# Patient Record
Sex: FEMALE | Race: WHITE | NOT HISPANIC OR LATINO | URBAN - METROPOLITAN AREA
[De-identification: names, ages, dates, MRNs, and addresses within clinical notes are randomized per-mention and may not be internally consistent; named-entity substitution may affect disease eponyms.]

---

## 2020-07-01 ENCOUNTER — OFFICE VISIT (OUTPATIENT)
Dept: URBAN - METROPOLITAN AREA CLINIC 113 | Facility: CLINIC | Age: 60
End: 2020-07-01

## 2020-07-25 ENCOUNTER — TELEPHONE ENCOUNTER (OUTPATIENT)
Dept: URBAN - METROPOLITAN AREA CLINIC 13 | Facility: CLINIC | Age: 60
End: 2020-07-25

## 2020-07-25 RX ORDER — PENTOSAN POLYSULFATE SODIUM 100 MG/1
TAKE CAPSULE  PRN CAPSULE, GELATIN COATED ORAL
Refills: 0 | OUTPATIENT
End: 2017-06-22

## 2020-07-25 RX ORDER — METHENAMINE, SODIUM PHOSPHATE, MONOBASIC, MONOHYDRATE, PHENYL SALICYLATE, METHYLENE BLUE, AND HYOSCYAMINE SULFATE 118; 40.8; 36; 10; .12 MG/1; MG/1; MG/1; MG/1; MG/1
CAPSULE ORAL
Refills: 0 | OUTPATIENT
End: 2017-06-22

## 2020-07-25 RX ORDER — POLYETHYLENE GLYCOL 3350, SODIUM CHLORIDE, SODIUM BICARBONATE AND POTASSIUM CHLORIDE WITH LEMON FLAVOR 420; 11.2; 5.72; 1.48 G/4L; G/4L; G/4L; G/4L
TAKE 1/2 GALLON AT 5:00 PM DAY BEFORE PROCEDURE, TAKE SECOND 1/2 OF GALLON 6 HRS PRIOR TO PROCEDURE POWDER, FOR SOLUTION ORAL
Qty: 1 | Refills: 0 | OUTPATIENT
Start: 2017-08-28 | End: 2017-12-15

## 2020-07-25 RX ORDER — SITAGLIPTIN 50 MG/1
TAKE 1 TABLET DAILY TABLET, FILM COATED ORAL
Refills: 0 | OUTPATIENT
Start: 2017-06-22 | End: 2020-01-30

## 2020-07-25 RX ORDER — SUCRALFATE 1 G/10ML
TAKE 1 GM TWICE DAILY PRN SUSPENSION ORAL
Qty: 1 | Refills: 0 | OUTPATIENT
Start: 2017-07-19 | End: 2017-09-14

## 2020-07-25 RX ORDER — METOCLOPRAMIDE 5 MG/1
TAKE 1 TABLET BEFORE MEALS TABLET ORAL
Qty: 90 | Refills: 2 | OUTPATIENT
Start: 2017-09-14 | End: 2017-12-29

## 2020-07-25 RX ORDER — EZETIMIBE 10 MG/1
TAKE 1 TABLET AT BEDTIME TABLET ORAL
Refills: 0 | OUTPATIENT
End: 2020-07-01

## 2020-07-25 RX ORDER — METHENAMINE, SODIUM PHOSPHATE, MONOBASIC, MONOHYDRATE, PHENYL SALICYLATE, METHYLENE BLUE, AND HYOSCYAMINE SULFATE 118; 40.8; 36; 10; .12 MG/1; MG/1; MG/1; MG/1; MG/1
USE AS DIRECTED AS NEEDED CAPSULE ORAL
Refills: 0 | OUTPATIENT
End: 2020-01-30

## 2020-07-25 RX ORDER — DESVENLAFAXINE SUCCINATE 50 MG
TAKE 1 TABLET DAILY TABLET, EXTENDED RELEASE 24 HR ORAL
Refills: 0 | OUTPATIENT
End: 2017-06-22

## 2020-07-25 RX ORDER — MIRABEGRON 50 MG/1
TAKE TABLET  PRN TABLET, FILM COATED, EXTENDED RELEASE ORAL
Refills: 0 | OUTPATIENT
End: 2017-06-22

## 2020-07-25 RX ORDER — NITROFURANTOIN MONOHYDRATE/MACROCRYSTALLINE 25; 75 MG/1; MG/1
TAKE CAPSULE  PRN CAPSULE ORAL
Refills: 0 | OUTPATIENT
End: 2017-12-15

## 2020-07-25 RX ORDER — OMEPRAZOLE 20 MG/1
TAKE 1 CAPSULE DAILY CAPSULE, DELAYED RELEASE ORAL
Qty: 90 | Refills: 3 | OUTPATIENT
Start: 2017-12-15 | End: 2020-01-30

## 2020-07-25 RX ORDER — GLIMEPIRIDE 2 MG/1
TAKE 1 TABLET TWICE DAILY TABLET ORAL
Refills: 0 | OUTPATIENT
End: 2020-01-30

## 2020-07-25 RX ORDER — DIAZEPAM 10 MG/1
TAKE 1 TABLET 3 TIMES DAILY AS NEEDED TABLET ORAL
Refills: 0 | OUTPATIENT
End: 2017-09-14

## 2020-07-25 RX ORDER — LIOTHYRONINE SODIUM 5 UG/1
TAKE 1 TABLET DAILY TABLET ORAL
Refills: 0 | OUTPATIENT
End: 2017-09-14

## 2020-07-25 RX ORDER — GLIMEPIRIDE 4 MG/1
TAKE 1 TABLET DAILY TABLET ORAL
Refills: 0 | OUTPATIENT
Start: 2019-04-01 | End: 2020-02-25

## 2020-07-25 RX ORDER — EZETIMIBE 10 MG/1
TAKE 1 TABLET DAILY TABLET ORAL
Refills: 0 | OUTPATIENT
Start: 2017-06-22 | End: 2017-07-19

## 2020-07-25 RX ORDER — DEXLANSOPRAZOLE 60 MG/1
TAKE 1 CAPSULE AS DIRECTED CAPSULE, DELAYED RELEASE ORAL
Refills: 0 | OUTPATIENT
End: 2015-12-08

## 2020-07-25 RX ORDER — OMEPRAZOLE 40 MG/1
TAKE 1 CAPSULE TWICE DAILY BEFORE BREAKFAST AND BEFORE DINNER CAPSULE, DELAYED RELEASE ORAL
Qty: 60 | Refills: 3 | OUTPATIENT
End: 2020-01-30

## 2020-07-25 RX ORDER — MELOXICAM 15 MG/1
TAKE 1 TABLET DAILY AS NEEDED TABLET ORAL
Refills: 0 | OUTPATIENT
End: 2017-07-19

## 2020-07-25 RX ORDER — VIT C/E/CUPERIC/ZINC/LUTEIN 226-90-0.8
TAKE 1 CAPSULE DAILY CAPSULE ORAL
Refills: 0 | OUTPATIENT
End: 2015-12-08

## 2020-07-25 RX ORDER — ESZOPICLONE 3 MG/1
TAKE 1 TABLET AT BEDTIME AS NEEDED FOR SLEEP TABLET, FILM COATED ORAL
Refills: 0 | OUTPATIENT
Start: 2019-11-08 | End: 2020-01-30

## 2020-07-25 RX ORDER — DICYCLOMINE HYDROCHLORIDE 10 MG/1
TAKE 1 CAPSULE BY MOUTH EVERY 6 HOURS AS NEEDED FOR ABDOMINAL CRAMPS CAPSULE ORAL
Qty: 30 | Refills: 0 | OUTPATIENT
Start: 2016-11-08 | End: 2020-01-30

## 2020-07-25 RX ORDER — SIMVASTATIN 20 MG/1
TAKE 1 TABLET DAILY TABLET, FILM COATED ORAL
Refills: 0 | OUTPATIENT
End: 2015-12-08

## 2020-07-25 RX ORDER — DICYCLOMINE HYDROCHLORIDE 10 MG/1
TAKE 1 CAPSULE EVERY 6 HOURS PRN IF NEEDED FOR PAIN CAPSULE ORAL
Qty: 45 | Refills: 2 | OUTPATIENT
Start: 2016-11-08

## 2020-07-25 RX ORDER — METFORMIN HYDROCHLORIDE 750 MG/1
TAKE 2 TABLETS ONCE DAILY WITH THE EVENING MEAL TABLET, EXTENDED RELEASE ORAL
Refills: 0 | OUTPATIENT
End: 2020-01-30

## 2020-07-25 RX ORDER — TRAMADOL HYDROCHLORIDE 50 MG/1
TAKE 1 TABLET BY MOUTH EVERY 6 HOURS AS NEEDED FOR PAIN TABLET ORAL
Qty: 50 | Refills: 0 | OUTPATIENT
End: 2020-01-30

## 2020-07-25 RX ORDER — EZETIMIBE 10 MG/1
TAKE 1 TABLET DAILY TABLET ORAL
Refills: 0 | OUTPATIENT
End: 2017-06-22

## 2020-07-25 RX ORDER — INSULIN DEGLUDEC INJECTION 200 U/ML
INJECT SUBCUTANEOUSLY AS DIRECTED INJECTION, SOLUTION SUBCUTANEOUS
Refills: 0 | OUTPATIENT
Start: 2019-11-12 | End: 2020-02-25

## 2020-07-25 RX ORDER — HYOSCYAMINE SULFATE 0.12 MG/1
PLACE 1 TABLET EVERY 6 HOURS PRN ABD PAIN TABLET, ORALLY DISINTEGRATING ORAL
Qty: 60 | Refills: 3 | OUTPATIENT
Start: 2016-02-05 | End: 2017-09-14

## 2020-07-25 RX ORDER — PANTOPRAZOLE SODIUM 40 MG/1
TAKE 1 TABLET DAILY TABLET, DELAYED RELEASE ORAL
Refills: 11 | OUTPATIENT
Start: 2019-11-27 | End: 2020-02-25

## 2020-07-25 RX ORDER — ALPRAZOLAM 0.5 MG
TAKE 1 TABLET DAILY AS NEEDED TABLET ORAL
Refills: 0 | OUTPATIENT
End: 2020-01-30

## 2020-07-25 RX ORDER — EZETIMIBE 10 MG/1
TAKE 1 TABLET DAILY TABLET ORAL
Qty: 30 | Refills: 0 | OUTPATIENT
Start: 2017-09-14

## 2020-07-26 ENCOUNTER — TELEPHONE ENCOUNTER (OUTPATIENT)
Dept: URBAN - METROPOLITAN AREA CLINIC 13 | Facility: CLINIC | Age: 60
End: 2020-07-26

## 2020-07-26 RX ORDER — AZITHROMYCIN DIHYDRATE 250 MG/1
TABLET, FILM COATED ORAL
Qty: 6 | Refills: 0 | Status: ACTIVE | COMMUNITY
Start: 2019-12-04

## 2020-07-26 RX ORDER — ALPRAZOLAM 1 MG/1
TAKE 1 TABLET 3 TIMES DAILY AS NEEDED TABLET ORAL
Refills: 0 | Status: ACTIVE | COMMUNITY
Start: 2019-10-01

## 2020-07-26 RX ORDER — SUCRALFATE 1 G/1
TABLET ORAL
Qty: 60 | Refills: 0 | Status: ACTIVE | COMMUNITY
Start: 2019-09-07

## 2020-07-26 RX ORDER — MIRABEGRON 50 MG/1
TAKE 1 TABLET DAILY TABLET, FILM COATED, EXTENDED RELEASE ORAL
Refills: 0 | Status: ACTIVE | COMMUNITY

## 2020-07-26 RX ORDER — PROMETHAZINE HYDROCHLORIDE 25 MG/1
TAKE 1 TABLET EVERY 6 HOURS PRN NAUSEA TABLET ORAL
Refills: 3 | Status: ACTIVE | COMMUNITY
Start: 2019-11-27

## 2020-07-26 RX ORDER — ONDANSETRON HYDROCHLORIDE 4 MG/1
TAKE 1 TABLET EVERY 8 HOURS AS NEEDED FOR NAUSEA AND VOMITING TABLET, FILM COATED ORAL
Qty: 30 | Refills: 1 | Status: ACTIVE | COMMUNITY
Start: 2020-01-30

## 2020-07-26 RX ORDER — BLOOD SUGAR DIAGNOSTIC
STRIP MISCELLANEOUS
Qty: 400 | Refills: 0 | Status: ACTIVE | COMMUNITY
Start: 2019-05-28

## 2020-07-26 RX ORDER — INSULIN GLARGINE AND LIXISENATIDE 100; 33 U/ML; UG/ML
INJECTION, SOLUTION SUBCUTANEOUS
Qty: 3 | Refills: 0 | Status: ACTIVE | COMMUNITY
Start: 2019-06-10

## 2020-07-26 RX ORDER — INSULIN GLARGINE AND LIXISENATIDE 100; 33 U/ML; UG/ML
INJECT 50 TO 60 UNITS D INJECTION, SOLUTION SUBCUTANEOUS
Qty: 6 | Refills: 0 | Status: ACTIVE | COMMUNITY
Start: 2019-06-25

## 2020-07-26 RX ORDER — INSULIN GLARGINE AND LIXISENATIDE 100; 33 U/ML; UG/ML
INJECTION, SOLUTION SUBCUTANEOUS
Qty: 15 | Refills: 0 | Status: ACTIVE | COMMUNITY
Start: 2019-07-16

## 2020-07-26 RX ORDER — DESVENLAFAXINE SUCCINATE 100 MG/1
TAKE 2 TABLET DAILY TABLET, EXTENDED RELEASE ORAL
Refills: 0 | Status: ACTIVE | COMMUNITY

## 2020-07-26 RX ORDER — DEXLANSOPRAZOLE 60 MG/1
TAKE 1 CAPSULE DAILY EVERY MORNING BEFORE BREAKFAST CAPSULE, DELAYED RELEASE ORAL
Qty: 90 | Refills: 3 | Status: ACTIVE | COMMUNITY
Start: 2020-01-30

## 2020-07-26 RX ORDER — DILTIAZEM HYDROCHLORIDE 120 MG/1
TK 1 C PO D CAPSULE, COATED, EXTENDED RELEASE ORAL
Qty: 90 | Refills: 0 | Status: ACTIVE | COMMUNITY
Start: 2020-02-04

## 2020-07-26 RX ORDER — INSULIN DEGLUDEC INJECTION 100 U/ML
INJECT 55 UNITS SC QAM INJECTION, SOLUTION SUBCUTANEOUS
Qty: 9 | Refills: 0 | Status: ACTIVE | COMMUNITY
Start: 2019-08-29

## 2020-07-26 RX ORDER — NITROFURANTOIN 50 MG/1
CAPSULE ORAL
Qty: 30 | Refills: 0 | Status: ACTIVE | COMMUNITY
Start: 2019-02-04

## 2020-07-26 RX ORDER — FLUCONAZOLE 150 MG/1
TABLET ORAL
Qty: 4 | Refills: 0 | Status: ACTIVE | COMMUNITY
Start: 2019-06-25

## 2020-07-26 RX ORDER — BLOOD SUGAR DIAGNOSTIC
STRIP MISCELLANEOUS
Qty: 400 | Refills: 0 | Status: ACTIVE | COMMUNITY
Start: 2019-08-14

## 2020-07-26 RX ORDER — AMOXICILLIN 500 MG/1
CAPSULE ORAL
Qty: 15 | Refills: 0 | Status: ACTIVE | COMMUNITY
Start: 2019-07-01

## 2020-07-26 RX ORDER — AZITHROMYCIN DIHYDRATE 250 MG/1
TABLET, FILM COATED ORAL
Qty: 6 | Refills: 0 | Status: ACTIVE | COMMUNITY
Start: 2019-12-05

## 2020-07-26 RX ORDER — INSULIN ASPART 100 [IU]/ML
INJECT SUBCUTANEOUSLY AS DIRECTED INJECTION, SOLUTION INTRAVENOUS; SUBCUTANEOUS
Refills: 0 | Status: ACTIVE | COMMUNITY
Start: 2019-09-19

## 2020-07-26 RX ORDER — ZOLPIDEM TARTRATE 10 MG/1
TK 1 T PO  QHS PRN TABLET, FILM COATED ORAL
Qty: 30 | Refills: 0 | Status: ACTIVE | COMMUNITY
Start: 2020-03-19

## 2020-07-26 RX ORDER — RIZATRIPTAN BENZOATE 10 MG/1
TAKE 1 TABLET AT ONSET OF HEADACHE. MAY REPEAT EVERY 2 HOURS AS NEEDED. MAXIMUM 3 TABLETS IN 24 HOURS TABLET ORAL
Refills: 0 | Status: ACTIVE | COMMUNITY
Start: 2019-11-27

## 2020-07-26 RX ORDER — ROSUVASTATIN CALCIUM 40 MG/1
TAKE 1 TABLET DAILY TABLET, FILM COATED ORAL
Refills: 0 | Status: ACTIVE | COMMUNITY

## 2020-07-26 RX ORDER — AMOXICILLIN AND CLAVULANATE POTASSIUM 875; 125 MG/1; MG/1
TABLET, FILM COATED ORAL
Qty: 14 | Refills: 0 | Status: ACTIVE | COMMUNITY
Start: 2019-11-07

## 2020-07-26 RX ORDER — INSULIN ASPART 100 [IU]/ML
INJECTION, SOLUTION INTRAVENOUS; SUBCUTANEOUS
Qty: 50 | Refills: 0 | Status: ACTIVE | COMMUNITY
Start: 2020-03-30

## 2020-07-26 RX ORDER — SUCRALFATE 1 G/1
TK 1 T PO AC AND HS ON AN EMPTY STOMACH TABLET ORAL
Qty: 60 | Refills: 0 | Status: ACTIVE | COMMUNITY
Start: 2019-09-06

## 2020-07-26 RX ORDER — NITROFURANTOIN 50 MG/1
CAPSULE ORAL
Qty: 30 | Refills: 0 | Status: ACTIVE | COMMUNITY
Start: 2019-06-14

## 2020-07-26 RX ORDER — HYDROXYZINE HYDROCHLORIDE 25 MG/1
TAKE 1 TABLET EVERY 8 HOURS PRN ITCHING TABLET, FILM COATED ORAL
Qty: 90 | Refills: 2 | Status: ACTIVE | COMMUNITY
Start: 2019-10-02

## 2020-07-26 RX ORDER — NYSTATIN 100000 [USP'U]/G
CREAM TOPICAL
Qty: 30 | Refills: 0 | Status: ACTIVE | COMMUNITY
Start: 2019-05-13

## 2020-07-26 RX ORDER — METHYLPREDNISOLONE 4 MG/1
TABLET ORAL
Qty: 21 | Refills: 0 | Status: ACTIVE | COMMUNITY
Start: 2019-07-22

## 2020-07-26 RX ORDER — INSULIN GLARGINE AND LIXISENATIDE 100; 33 U/ML; UG/ML
INJECTION, SOLUTION SUBCUTANEOUS
Qty: 3 | Refills: 0 | Status: ACTIVE | COMMUNITY
Start: 2019-05-28

## 2020-07-26 RX ORDER — BLOOD SUGAR DIAGNOSTIC
TEST BLOOD SUGAR 6 TIMES D STRIP MISCELLANEOUS
Qty: 150 | Refills: 0 | Status: ACTIVE | COMMUNITY
Start: 2020-02-20

## 2020-07-26 RX ORDER — CEPHALEXIN 500 MG/1
TK 1 C PO Q 12 H CAPSULE ORAL
Qty: 14 | Refills: 0 | Status: ACTIVE | COMMUNITY
Start: 2019-09-06

## 2020-07-26 RX ORDER — NYSTATIN 100000 [USP'U]/G
APPLY AA BID CREAM TOPICAL
Qty: 30 | Refills: 0 | Status: ACTIVE | COMMUNITY
Start: 2018-12-27

## 2020-07-26 RX ORDER — NYSTATIN 100000 [USP'U]/G
APP EXT AA BID CREAM TOPICAL
Qty: 30 | Refills: 0 | Status: ACTIVE | COMMUNITY
Start: 2019-10-02

## 2020-07-26 RX ORDER — FLUCONAZOLE 150 MG/1
TAKE AS DIRECTED TABLET ORAL
Qty: 6 | Refills: 0 | Status: ACTIVE | COMMUNITY
Start: 2019-10-09

## 2020-07-26 RX ORDER — INSULIN GLARGINE AND LIXISENATIDE 100; 33 U/ML; UG/ML
INJ 45 UNITS SC QD. INCREASE BY 5 UNITS Q 5 DAYS UTD. MAXIMUM OF 60 UN INJECTION, SOLUTION SUBCUTANEOUS
Qty: 15 | Refills: 0 | Status: ACTIVE | COMMUNITY
Start: 2019-07-09

## 2020-07-26 RX ORDER — AMOXICILLIN 500 MG/1
CAPSULE ORAL
Qty: 20 | Refills: 0 | Status: ACTIVE | COMMUNITY
Start: 2019-07-03

## 2020-07-26 RX ORDER — FAMOTIDINE 40 MG/1
TAKE 1 TABLET BY MOUTH AT BEDTIME TABLET ORAL
Qty: 90 | Refills: 3 | Status: ACTIVE | COMMUNITY
Start: 2020-07-01

## 2020-07-26 RX ORDER — DILTIAZEM HYDROCHLORIDE 120 MG/1
CAPSULE, COATED, EXTENDED RELEASE ORAL
Qty: 90 | Refills: 0 | Status: ACTIVE | COMMUNITY
Start: 2020-02-05

## 2020-07-26 RX ORDER — CANAGLIFLOZIN 100 MG/1
TAKE 1 TABLET BY MOUTH ONCE DAILY 30 MINS BEFORE BREAKFAST TABLET, FILM COATED ORAL
Refills: 0 | Status: ACTIVE | COMMUNITY
Start: 2020-01-13

## 2020-07-26 RX ORDER — ZOLPIDEM TARTRATE 10 MG/1
TK 1 T PO QD HS TABLET, FILM COATED ORAL
Qty: 30 | Refills: 0 | Status: ACTIVE | COMMUNITY
Start: 2020-05-12

## 2020-07-26 RX ORDER — AMOXICILLIN 500 MG/1
CAPSULE ORAL
Qty: 20 | Refills: 0 | Status: ACTIVE | COMMUNITY
Start: 2019-08-14

## 2020-07-26 RX ORDER — DICYCLOMINE HYDROCHLORIDE 10 MG/1
TAKE 1 CAPSULE EVERY 6 HOURS AS NEEDED CAPSULE ORAL
Qty: 60 | Refills: 1 | Status: ACTIVE | COMMUNITY
Start: 2020-02-25

## 2020-07-26 RX ORDER — INSULIN DEGLUDEC INJECTION 100 U/ML
INJECTION, SOLUTION SUBCUTANEOUS
Qty: 9 | Refills: 0 | Status: ACTIVE | COMMUNITY
Start: 2019-09-18

## 2020-07-26 RX ORDER — ONDANSETRON HYDROCHLORIDE 4 MG/1
TABLET, FILM COATED ORAL
Qty: 18 | Refills: 0 | Status: ACTIVE | COMMUNITY
Start: 2019-05-13

## 2020-07-26 RX ORDER — INSULIN DEGLUDEC INJECTION 100 U/ML
INJECTION, SOLUTION SUBCUTANEOUS
Qty: 9 | Refills: 0 | Status: ACTIVE | COMMUNITY
Start: 2019-10-31

## 2020-07-26 RX ORDER — INSULIN ASPART 100 [IU]/ML
ADM UP TO 80 UNI VIA INSULIN PUMP D INJECTION, SOLUTION INTRAVENOUS; SUBCUTANEOUS
Qty: 20 | Refills: 0 | Status: ACTIVE | COMMUNITY
Start: 2020-02-20

## 2020-07-26 RX ORDER — LEVOTHYROXINE SODIUM 50 UG/1
TAKE 1 TABLET DAILY TABLET ORAL
Refills: 0 | Status: ACTIVE | COMMUNITY

## 2020-07-26 RX ORDER — METHYLPREDNISOLONE 4 MG/1
TABLET ORAL
Qty: 21 | Refills: 0 | Status: ACTIVE | COMMUNITY
Start: 2019-09-17

## 2020-07-26 RX ORDER — TRAZODONE HYDROCHLORIDE 100 MG/1
TABLET, FILM COATED ORAL
Qty: 90 | Refills: 0 | Status: ACTIVE | COMMUNITY
Start: 2019-06-28

## 2020-07-26 RX ORDER — FENOFIBRATE 145 MG/1
TABLET, FILM COATED ORAL
Qty: 30 | Refills: 0 | Status: ACTIVE | COMMUNITY
Start: 2019-09-17

## 2020-07-26 RX ORDER — LISINOPRIL AND HYDROCHLOROTHIAZIDE TABLETS 20; 12.5 MG/1; MG/1
TAKE 1 TABLET DAILY TABLET ORAL
Refills: 0 | Status: ACTIVE | COMMUNITY

## 2020-07-26 RX ORDER — INSULIN ASPART 100 [IU]/ML
INJECTION, SOLUTION INTRAVENOUS; SUBCUTANEOUS
Qty: 20 | Refills: 0 | Status: ACTIVE | COMMUNITY
Start: 2020-03-10

## 2020-07-26 RX ORDER — TRAZODONE HYDROCHLORIDE 100 MG/1
TK 1 T PO Q NIGHT TABLET, FILM COATED ORAL
Qty: 90 | Refills: 0 | Status: ACTIVE | COMMUNITY
Start: 2019-10-02

## 2020-07-26 RX ORDER — FENOFIBRATE 145 MG/1
TABLET, FILM COATED ORAL
Qty: 30 | Refills: 0 | Status: ACTIVE | COMMUNITY
Start: 2019-11-07

## 2020-07-26 RX ORDER — PREGABALIN 75 MG/1
TAKE 1 CAPSULE AT BEDTIME CAPSULE ORAL
Refills: 0 | Status: ACTIVE | COMMUNITY
Start: 2020-02-17

## 2020-07-26 RX ORDER — TRAZODONE HYDROCHLORIDE 100 MG/1
TABLET, FILM COATED ORAL
Qty: 90 | Refills: 0 | Status: ACTIVE | COMMUNITY
Start: 2019-03-27

## 2020-07-26 RX ORDER — INSULIN GLARGINE AND LIXISENATIDE 100; 33 U/ML; UG/ML
INJECTION, SOLUTION SUBCUTANEOUS
Qty: 6 | Refills: 0 | Status: ACTIVE | COMMUNITY
Start: 2019-07-07

## 2020-08-27 ENCOUNTER — OFFICE VISIT (OUTPATIENT)
Dept: URBAN - METROPOLITAN AREA CLINIC 113 | Facility: CLINIC | Age: 60
End: 2020-08-27
Payer: COMMERCIAL

## 2020-08-27 ENCOUNTER — WEB ENCOUNTER (OUTPATIENT)
Dept: URBAN - METROPOLITAN AREA CLINIC 113 | Facility: CLINIC | Age: 60
End: 2020-08-27

## 2020-08-27 VITALS
DIASTOLIC BLOOD PRESSURE: 80 MMHG | SYSTOLIC BLOOD PRESSURE: 115 MMHG | HEART RATE: 93 BPM | BODY MASS INDEX: 30.33 KG/M2 | TEMPERATURE: 98.4 F | HEIGHT: 63 IN | WEIGHT: 171.2 LBS

## 2020-08-27 DIAGNOSIS — K83.8 DILATED BILE DUCT: ICD-10-CM

## 2020-08-27 DIAGNOSIS — R11.0 NAUSEA: ICD-10-CM

## 2020-08-27 DIAGNOSIS — R10.13 EPIGASTRIC PAIN: ICD-10-CM

## 2020-08-27 DIAGNOSIS — E11.43 TYPE 2 DIABETES MELLITUS WITH DIABETIC AUTONOMIC (POLY)NEUROPATHY: ICD-10-CM

## 2020-08-27 PROCEDURE — 99213 OFFICE O/P EST LOW 20 MIN: CPT | Performed by: INTERNAL MEDICINE

## 2020-08-27 RX ORDER — DICYCLOMINE HYDROCHLORIDE 10 MG/1
TAKE 1 CAPSULE EVERY 6 HOURS AS NEEDED CAPSULE ORAL
Qty: 60 | Refills: 1 | Status: ACTIVE | COMMUNITY
Start: 2020-02-25

## 2020-08-27 RX ORDER — SUCRALFATE 1 G/1
TK 1 T PO AC AND HS ON AN EMPTY STOMACH TABLET ORAL
Qty: 60 | Refills: 0 | Status: ON HOLD | COMMUNITY
Start: 2019-09-06

## 2020-08-27 RX ORDER — INSULIN GLARGINE AND LIXISENATIDE 100; 33 U/ML; UG/ML
INJECTION, SOLUTION SUBCUTANEOUS
Qty: 3 | Refills: 0 | Status: ON HOLD | COMMUNITY
Start: 2019-05-28

## 2020-08-27 RX ORDER — CEPHALEXIN 500 MG/1
TK 1 C PO Q 12 H CAPSULE ORAL
Qty: 14 | Refills: 0 | Status: ON HOLD | COMMUNITY
Start: 2019-09-06

## 2020-08-27 RX ORDER — INSULIN ASPART 100 [IU]/ML
INJECT SUBCUTANEOUSLY AS DIRECTED INJECTION, SOLUTION INTRAVENOUS; SUBCUTANEOUS
Refills: 0 | Status: ON HOLD | COMMUNITY
Start: 2019-09-19

## 2020-08-27 RX ORDER — EZETIMIBE 10 MG/1
1 TABLET TABLET ORAL ONCE A DAY
Status: ACTIVE | COMMUNITY

## 2020-08-27 RX ORDER — ONABOTULINUMTOXINA 50 [USP'U]/1
AS DIRECTED INJECTION, POWDER, LYOPHILIZED, FOR SOLUTION INTRAMUSCULAR
Status: ACTIVE | COMMUNITY

## 2020-08-27 RX ORDER — BLOOD SUGAR DIAGNOSTIC
STRIP MISCELLANEOUS
Qty: 400 | Refills: 0 | Status: ON HOLD | COMMUNITY
Start: 2019-05-28

## 2020-08-27 RX ORDER — LEVOTHYROXINE SODIUM 50 UG/1
TAKE 1 TABLET DAILY TABLET ORAL
Refills: 0 | Status: ACTIVE | COMMUNITY

## 2020-08-27 RX ORDER — METHYLPREDNISOLONE 4 MG/1
TABLET ORAL
Qty: 21 | Refills: 0 | Status: ON HOLD | COMMUNITY
Start: 2019-07-22

## 2020-08-27 RX ORDER — POLYETHYLENE GLYCOL 3350 17 G/17G
AS DIRECTED POWDER, FOR SOLUTION ORAL
Status: ACTIVE | COMMUNITY

## 2020-08-27 RX ORDER — DEXLANSOPRAZOLE 60 MG/1
1 CAPSULE CAPSULE, DELAYED RELEASE ORAL ONCE A DAY
Status: ACTIVE | COMMUNITY

## 2020-08-27 RX ORDER — MIRABEGRON 50 MG/1
TAKE 1 TABLET DAILY TABLET, FILM COATED, EXTENDED RELEASE ORAL
Refills: 0 | Status: ON HOLD | COMMUNITY

## 2020-08-27 RX ORDER — DILTIAZEM HYDROCHLORIDE 120 MG/1
1 CAPSULE CAPSULE, COATED, EXTENDED RELEASE ORAL ONCE A DAY
Status: ACTIVE | COMMUNITY

## 2020-08-27 RX ORDER — PROMETHAZINE HYDROCHLORIDE 25 MG/ML
1 ML AS NEEDED INJECTION INTRAMUSCULAR; INTRAVENOUS
Status: ACTIVE | COMMUNITY

## 2020-08-27 RX ORDER — TRAZODONE HYDROCHLORIDE 100 MG/1
TABLET, FILM COATED ORAL
Qty: 90 | Refills: 0 | Status: ON HOLD | COMMUNITY
Start: 2019-03-27

## 2020-08-27 RX ORDER — NYSTATIN 100000 [USP'U]/G
APPLY AA BID CREAM TOPICAL
Qty: 30 | Refills: 0 | Status: ON HOLD | COMMUNITY
Start: 2018-12-27

## 2020-08-27 RX ORDER — CYCLOBENZAPRINE HYDROCHLORIDE 10 MG/1
1 TABLET AT BEDTIME AS NEEDED TABLET, FILM COATED ORAL ONCE A DAY
Status: ACTIVE | COMMUNITY

## 2020-08-27 RX ORDER — FLUCONAZOLE 150 MG/1
TABLET ORAL
Qty: 4 | Refills: 0 | Status: ON HOLD | COMMUNITY
Start: 2019-06-25

## 2020-08-27 RX ORDER — LISINOPRIL AND HYDROCHLOROTHIAZIDE TABLETS 20; 12.5 MG/1; MG/1
TAKE 1 TABLET DAILY TABLET ORAL
Refills: 0 | Status: ACTIVE | COMMUNITY

## 2020-08-27 RX ORDER — WHEAT DEXTRIN 3 G/3.5 G
AS DIRECTED POWDER (GRAM) ORAL
Status: ACTIVE | COMMUNITY

## 2020-08-27 RX ORDER — HYDROXYZINE HYDROCHLORIDE 25 MG/1
TAKE 1 TABLET EVERY 8 HOURS PRN ITCHING TABLET, FILM COATED ORAL
Qty: 90 | Refills: 2 | Status: ON HOLD | COMMUNITY
Start: 2019-10-02

## 2020-08-27 RX ORDER — TRAMADOL HYDROCHLORIDE 50 MG/1
1 TABLET AS NEEDED TABLET, FILM COATED ORAL ONCE A DAY
Status: ACTIVE | COMMUNITY

## 2020-08-27 RX ORDER — MIRABEGRON 50 MG/1
1 TABLET TABLET, FILM COATED, EXTENDED RELEASE ORAL ONCE A DAY
Status: ACTIVE | COMMUNITY

## 2020-08-27 RX ORDER — DILTIAZEM HYDROCHLORIDE 120 MG/1
TK 1 C PO D CAPSULE, COATED, EXTENDED RELEASE ORAL
Qty: 90 | Refills: 0 | Status: ON HOLD | COMMUNITY
Start: 2020-02-04

## 2020-08-27 RX ORDER — METHENAMINE, SODIUM PHOSPHATE, MONOBASIC, MONOHYDRATE, PHENYL SALICYLATE, METHYLENE BLUE, AND HYOSCYAMINE SULFATE 118; 40.8; 36; 10; .12 MG/1; MG/1; MG/1; MG/1; MG/1
1 CAPSULE CAPSULE ORAL
Status: ACTIVE | COMMUNITY

## 2020-08-27 RX ORDER — CANAGLIFLOZIN 100 MG/1
TAKE 1 TABLET BY MOUTH ONCE DAILY 30 MINS BEFORE BREAKFAST TABLET, FILM COATED ORAL
Refills: 0 | Status: ACTIVE | COMMUNITY
Start: 2020-01-13

## 2020-08-27 RX ORDER — NITROFURANTOIN 50 MG/1
CAPSULE ORAL
Qty: 30 | Refills: 0 | Status: ON HOLD | COMMUNITY
Start: 2019-02-04

## 2020-08-27 RX ORDER — PREGABALIN 75 MG/1
TAKE 1 CAPSULE AT BEDTIME CAPSULE ORAL
Refills: 0 | Status: ON HOLD | COMMUNITY
Start: 2020-02-17

## 2020-08-27 RX ORDER — INSULIN DEGLUDEC INJECTION 100 U/ML
INJECT 55 UNITS SC QAM INJECTION, SOLUTION SUBCUTANEOUS
Qty: 9 | Refills: 0 | Status: ON HOLD | COMMUNITY
Start: 2019-08-29

## 2020-08-27 RX ORDER — INSULIN ASPART 100 [IU]/ML
ADM UP TO 80 UNI VIA INSULIN PUMP D INJECTION, SOLUTION INTRAVENOUS; SUBCUTANEOUS
Qty: 20 | Refills: 0 | Status: ON HOLD | COMMUNITY
Start: 2020-02-20

## 2020-08-27 RX ORDER — METOCLOPRAMIDE 10 MG/1
1 TABLET BEFORE MEALS TABLET ORAL TWICE A DAY
Qty: 60 TABLET | Refills: 3 | OUTPATIENT
Start: 2020-08-27

## 2020-08-27 RX ORDER — BLOOD SUGAR DIAGNOSTIC
TEST BLOOD SUGAR 6 TIMES D STRIP MISCELLANEOUS
Qty: 150 | Refills: 0 | Status: ON HOLD | COMMUNITY
Start: 2020-02-20

## 2020-08-27 RX ORDER — FENOFIBRATE 145 MG/1
TABLET, FILM COATED ORAL
Qty: 30 | Refills: 0 | Status: ON HOLD | COMMUNITY
Start: 2019-09-17

## 2020-08-27 RX ORDER — AMOXICILLIN 500 MG/1
CAPSULE ORAL
Qty: 15 | Refills: 0 | Status: ON HOLD | COMMUNITY
Start: 2019-07-01

## 2020-08-27 RX ORDER — DEXLANSOPRAZOLE 60 MG/1
TAKE 1 CAPSULE DAILY EVERY MORNING BEFORE BREAKFAST CAPSULE, DELAYED RELEASE ORAL
Qty: 90 | Refills: 3 | Status: ON HOLD | COMMUNITY
Start: 2020-01-30

## 2020-08-27 RX ORDER — AMOXICILLIN AND CLAVULANATE POTASSIUM 875; 125 MG/1; MG/1
TABLET, FILM COATED ORAL
Qty: 14 | Refills: 0 | Status: ON HOLD | COMMUNITY
Start: 2019-11-07

## 2020-08-27 RX ORDER — PROMETHAZINE HYDROCHLORIDE 25 MG/1
TAKE 1 TABLET EVERY 6 HOURS PRN NAUSEA TABLET ORAL
Refills: 3 | Status: ON HOLD | COMMUNITY
Start: 2019-11-27

## 2020-08-27 RX ORDER — ONDANSETRON HYDROCHLORIDE 4 MG/1
TABLET, FILM COATED ORAL
Qty: 18 | Refills: 0 | Status: ACTIVE | COMMUNITY
Start: 2019-05-13

## 2020-08-27 RX ORDER — AZITHROMYCIN DIHYDRATE 250 MG/1
TABLET, FILM COATED ORAL
Qty: 6 | Refills: 0 | Status: ON HOLD | COMMUNITY
Start: 2019-12-04

## 2020-08-27 RX ORDER — DESVENLAFAXINE SUCCINATE 100 MG/1
TAKE 2 TABLET DAILY TABLET, EXTENDED RELEASE ORAL
Refills: 0 | Status: ACTIVE | COMMUNITY

## 2020-08-27 RX ORDER — FAMOTIDINE 40 MG/1
TAKE 1 TABLET BY MOUTH AT BEDTIME TABLET ORAL
Qty: 90 | Refills: 3 | Status: ACTIVE | COMMUNITY
Start: 2020-07-01

## 2020-08-27 RX ORDER — RIZATRIPTAN BENZOATE 10 MG/1
TAKE 1 TABLET AT ONSET OF HEADACHE. MAY REPEAT EVERY 2 HOURS AS NEEDED. MAXIMUM 3 TABLETS IN 24 HOURS TABLET ORAL
Refills: 0 | Status: ON HOLD | COMMUNITY
Start: 2019-11-27

## 2020-08-27 RX ORDER — ROSUVASTATIN CALCIUM 40 MG/1
TAKE 1 TABLET DAILY TABLET, FILM COATED ORAL
Refills: 0 | Status: ON HOLD | COMMUNITY

## 2020-08-27 RX ORDER — PREGABALIN 75 MG/1
1 CAPSULE CAPSULE ORAL ONCE A DAY
Status: ACTIVE | COMMUNITY

## 2020-08-27 RX ORDER — ZOLPIDEM TARTRATE 10 MG/1
TK 1 T PO  QHS PRN TABLET, FILM COATED ORAL
Qty: 30 | Refills: 0 | Status: ACTIVE | COMMUNITY
Start: 2020-03-19

## 2020-08-27 RX ORDER — ALPRAZOLAM 1 MG/1
TAKE 1 TABLET 3 TIMES DAILY AS NEEDED TABLET ORAL
Refills: 0 | Status: ACTIVE | COMMUNITY
Start: 2019-10-01

## 2020-08-27 NOTE — EXAM-PHYSICAL EXAM
She is alert and oriented to person place and situation no acute distress.  There is no scleral icterus.

## 2020-08-27 NOTE — HPI-OTHER HISTORIES
Colon 2017 difficult but unremarkable.  EUS negative other than dilation of bile duct and PD down to ampulla.  Insurance rejected repeat MRI for f/u.  In past I have seen her for duodenal ulcers, nausea related to poor control of diabetes, IBS, and dyspepsia.

## 2020-08-27 NOTE — HPI-TODAY'S VISIT:
The patient is a very pleasant 59-year-old female who I followed for constipation, gastroparesis, nausea, abdominal pain who recently developed much worsening of her diabetes with poor control.  Imaging studies showed dilated bile duct and pancreatic duct.  Endoscopic ultrasound was negative.  She now has developed abnormal LFTs in the epigastric pain radiating to her back is becoming worse.  She also has bloating and constipation.  She is on MiraLAX and fiber.  Blood sugars are under much better control closer to 200.  Her insurance company recently rejected her MRI.

## 2020-10-01 ENCOUNTER — OFFICE VISIT (OUTPATIENT)
Dept: URBAN - METROPOLITAN AREA CLINIC 113 | Facility: CLINIC | Age: 60
End: 2020-10-01

## 2020-10-26 ENCOUNTER — TELEPHONE ENCOUNTER (OUTPATIENT)
Dept: URBAN - METROPOLITAN AREA CLINIC 113 | Facility: CLINIC | Age: 60
End: 2020-10-26

## 2020-10-27 ENCOUNTER — OFFICE VISIT (OUTPATIENT)
Dept: URBAN - METROPOLITAN AREA CLINIC 113 | Facility: CLINIC | Age: 60
End: 2020-10-27

## 2020-10-27 NOTE — HPI-TODAY'S VISIT:
Ms. Yost is a 60-year-old female with a history of stage III chronic kidney disease, hypertension, diabetes, hypothyroidism, constipation, and gastroparesis, presenting for follow-up. She was last seen on 8/27/2020 regarding dilated bile duct and pancreatic duct with new onset abnormal LFTs.  MRI was ordered to rule out pancreatic adenocarcinoma and common bile duct stone.  She was started on Reglan 10 mg twice daily for gastroparesis, and was to titrate MiraLAX dose in addition to fiber supplement.  Pepcid was discontinued and she was to continue Dexilant 60 mg daily.  Motegrity was a consideration. MRI abdomen with and without contrast on 9/12/2020 showed mild intra and extrahepatic biliary dilatation in the setting of prior cholecystectomy, sidebranch IPMN along the uncinate process of the pancreas measuring 0.5 cm unchanged from prior, and mild hepatic steatosis.  No common bile duct stone, filling defect or stricture.

## 2020-10-30 ENCOUNTER — ERX REFILL RESPONSE (OUTPATIENT)
Age: 60
End: 2020-10-30

## 2020-10-30 RX ORDER — DICYCLOMINE HYDROCHLORIDE 10 MG/1
TAKE 1 CAPSULE BY MOUTH EVERY 6 HOURS AS NEEDED CAPSULE ORAL
Qty: 60 | Refills: 0

## 2020-11-04 ENCOUNTER — WEB ENCOUNTER (OUTPATIENT)
Dept: URBAN - METROPOLITAN AREA CLINIC 113 | Facility: CLINIC | Age: 60
End: 2020-11-04

## 2020-11-30 ENCOUNTER — LAB OUTSIDE AN ENCOUNTER (OUTPATIENT)
Dept: URBAN - METROPOLITAN AREA CLINIC 113 | Facility: CLINIC | Age: 60
End: 2020-11-30

## 2020-11-30 ENCOUNTER — CLAIMS CREATED FROM THE CLAIM WINDOW (OUTPATIENT)
Dept: URBAN - METROPOLITAN AREA CLINIC 113 | Facility: CLINIC | Age: 60
End: 2020-11-30
Payer: COMMERCIAL

## 2020-11-30 ENCOUNTER — WEB ENCOUNTER (OUTPATIENT)
Dept: URBAN - METROPOLITAN AREA CLINIC 113 | Facility: CLINIC | Age: 60
End: 2020-11-30

## 2020-11-30 VITALS
HEIGHT: 63 IN | WEIGHT: 174 LBS | DIASTOLIC BLOOD PRESSURE: 79 MMHG | SYSTOLIC BLOOD PRESSURE: 134 MMHG | HEART RATE: 99 BPM | TEMPERATURE: 98.4 F | BODY MASS INDEX: 30.83 KG/M2

## 2020-11-30 DIAGNOSIS — D49.0 IPMN (INTRADUCTAL PAPILLARY MUCINOUS NEOPLASM): ICD-10-CM

## 2020-11-30 DIAGNOSIS — R10.13 EPIGASTRIC PAIN: ICD-10-CM

## 2020-11-30 DIAGNOSIS — K83.8 DILATED BILE DUCT: ICD-10-CM

## 2020-11-30 DIAGNOSIS — K21.9 GASTROESOPHAGEAL REFLUX DISEASE WITHOUT ESOPHAGITIS: ICD-10-CM

## 2020-11-30 DIAGNOSIS — R74.8 ABNORMAL LIVER ENZYMES: ICD-10-CM

## 2020-11-30 PROCEDURE — 99213 OFFICE O/P EST LOW 20 MIN: CPT | Performed by: INTERNAL MEDICINE

## 2020-11-30 PROCEDURE — G9903 PT SCRN TBCO ID AS NON USER: HCPCS | Performed by: INTERNAL MEDICINE

## 2020-11-30 PROCEDURE — G8427 DOCREV CUR MEDS BY ELIG CLIN: HCPCS | Performed by: INTERNAL MEDICINE

## 2020-11-30 PROCEDURE — 1036F TOBACCO NON-USER: CPT | Performed by: INTERNAL MEDICINE

## 2020-11-30 RX ORDER — AMOXICILLIN AND CLAVULANATE POTASSIUM 875; 125 MG/1; MG/1
TABLET, FILM COATED ORAL
Qty: 14 | Refills: 0 | Status: ON HOLD | COMMUNITY
Start: 2019-11-07

## 2020-11-30 RX ORDER — FLUCONAZOLE 150 MG/1
TABLET ORAL
Qty: 4 | Refills: 0 | Status: ON HOLD | COMMUNITY
Start: 2019-06-25

## 2020-11-30 RX ORDER — CEPHALEXIN 500 MG/1
TK 1 C PO Q 12 H CAPSULE ORAL
Qty: 14 | Refills: 0 | Status: ON HOLD | COMMUNITY
Start: 2019-09-06

## 2020-11-30 RX ORDER — ALPRAZOLAM 1 MG/1
TAKE 1 TABLET 3 TIMES DAILY AS NEEDED TABLET ORAL
Refills: 0 | Status: ACTIVE | COMMUNITY
Start: 2019-10-01

## 2020-11-30 RX ORDER — AZITHROMYCIN DIHYDRATE 250 MG/1
TABLET, FILM COATED ORAL
Qty: 6 | Refills: 0 | Status: ON HOLD | COMMUNITY
Start: 2019-12-04

## 2020-11-30 RX ORDER — DILTIAZEM HYDROCHLORIDE 120 MG/1
1 CAPSULE CAPSULE, COATED, EXTENDED RELEASE ORAL ONCE A DAY
Status: ACTIVE | COMMUNITY

## 2020-11-30 RX ORDER — METHYLPREDNISOLONE 4 MG/1
TABLET ORAL
Qty: 21 | Refills: 0 | Status: ON HOLD | COMMUNITY
Start: 2019-07-22

## 2020-11-30 RX ORDER — PREGABALIN 75 MG/1
1 CAPSULE CAPSULE ORAL ONCE A DAY
Status: ACTIVE | COMMUNITY

## 2020-11-30 RX ORDER — TRAMADOL HYDROCHLORIDE 50 MG/1
1 TABLET AS NEEDED TABLET, FILM COATED ORAL ONCE A DAY
Status: ACTIVE | COMMUNITY

## 2020-11-30 RX ORDER — DEXLANSOPRAZOLE 60 MG/1
1 CAPSULE CAPSULE, DELAYED RELEASE ORAL ONCE A DAY
Status: ACTIVE | COMMUNITY

## 2020-11-30 RX ORDER — BLOOD SUGAR DIAGNOSTIC
STRIP MISCELLANEOUS
Qty: 400 | Refills: 0 | Status: ON HOLD | COMMUNITY
Start: 2019-05-28

## 2020-11-30 RX ORDER — ASPIRIN 81 MG/1
1 TABLET TABLET ORAL ONCE A DAY
Status: ACTIVE | COMMUNITY

## 2020-11-30 RX ORDER — ICOSAPENT ETHYL 1000 MG/1
2 CAPSULES WITH MEALS CAPSULE ORAL TWICE A DAY
Status: ACTIVE | COMMUNITY

## 2020-11-30 RX ORDER — INSULIN GLARGINE AND LIXISENATIDE 100; 33 U/ML; UG/ML
INJECTION, SOLUTION SUBCUTANEOUS
Qty: 3 | Refills: 0 | Status: ON HOLD | COMMUNITY
Start: 2019-05-28

## 2020-11-30 RX ORDER — BLOOD SUGAR DIAGNOSTIC
TEST BLOOD SUGAR 6 TIMES D STRIP MISCELLANEOUS
Qty: 150 | Refills: 0 | Status: ON HOLD | COMMUNITY
Start: 2020-02-20

## 2020-11-30 RX ORDER — MIRABEGRON 50 MG/1
1 TABLET TABLET, FILM COATED, EXTENDED RELEASE ORAL ONCE A DAY
Status: ACTIVE | COMMUNITY

## 2020-11-30 RX ORDER — INSULIN ASPART 100 [IU]/ML
ADM UP TO 80 UNI VIA INSULIN PUMP D INJECTION, SOLUTION INTRAVENOUS; SUBCUTANEOUS
Qty: 20 | Refills: 0 | Status: ON HOLD | COMMUNITY
Start: 2020-02-20

## 2020-11-30 RX ORDER — HYDROXYZINE HYDROCHLORIDE 25 MG/1
TAKE 1 TABLET EVERY 8 HOURS PRN ITCHING TABLET, FILM COATED ORAL
Qty: 90 | Refills: 2 | Status: ON HOLD | COMMUNITY
Start: 2019-10-02

## 2020-11-30 RX ORDER — PROMETHAZINE HYDROCHLORIDE 25 MG/1
TAKE 1 TABLET EVERY 6 HOURS PRN NAUSEA TABLET ORAL
Refills: 3 | Status: ON HOLD | COMMUNITY
Start: 2019-11-27

## 2020-11-30 RX ORDER — AMOXICILLIN 500 MG/1
CAPSULE ORAL
Qty: 15 | Refills: 0 | Status: ON HOLD | COMMUNITY
Start: 2019-07-01

## 2020-11-30 RX ORDER — ROSUVASTATIN CALCIUM 40 MG/1
TAKE 1 TABLET DAILY TABLET, FILM COATED ORAL
Refills: 0 | Status: ACTIVE | COMMUNITY

## 2020-11-30 RX ORDER — INSULIN DEGLUDEC INJECTION 100 U/ML
INJECT 55 UNITS SC QAM INJECTION, SOLUTION SUBCUTANEOUS
Qty: 9 | Refills: 0 | Status: ON HOLD | COMMUNITY
Start: 2019-08-29

## 2020-11-30 RX ORDER — LEVONORGESTREL/ETHIN.ESTRADIOL 0.15-0.03
1 TABLET IN THE MORNING TABLET ORAL ONCE A DAY
Status: ACTIVE | COMMUNITY

## 2020-11-30 RX ORDER — EZETIMIBE 10 MG/1
1 TABLET TABLET ORAL ONCE A DAY
Status: ACTIVE | COMMUNITY

## 2020-11-30 RX ORDER — DICYCLOMINE HYDROCHLORIDE 10 MG/1
TAKE 1 CAPSULE BY MOUTH EVERY 6 HOURS AS NEEDED CAPSULE ORAL
Qty: 60 | Refills: 0 | Status: ACTIVE | COMMUNITY

## 2020-11-30 RX ORDER — METHENAMINE, SODIUM PHOSPHATE, MONOBASIC, MONOHYDRATE, PHENYL SALICYLATE, METHYLENE BLUE, AND HYOSCYAMINE SULFATE 118; 40.8; 36; 10; .12 MG/1; MG/1; MG/1; MG/1; MG/1
1 CAPSULE CAPSULE ORAL
Status: ACTIVE | COMMUNITY

## 2020-11-30 RX ORDER — CANAGLIFLOZIN 100 MG/1
TAKE 1 TABLET BY MOUTH ONCE DAILY 30 MINS BEFORE BREAKFAST TABLET, FILM COATED ORAL
Refills: 0 | Status: ON HOLD | COMMUNITY
Start: 2020-01-13

## 2020-11-30 RX ORDER — MIRABEGRON 50 MG/1
TAKE 1 TABLET DAILY TABLET, FILM COATED, EXTENDED RELEASE ORAL
Refills: 0 | Status: ON HOLD | COMMUNITY

## 2020-11-30 RX ORDER — ZOLPIDEM TARTRATE 10 MG/1
TK 1 T PO  QHS PRN TABLET, FILM COATED ORAL
Qty: 30 | Refills: 0 | Status: ACTIVE | COMMUNITY
Start: 2020-03-19

## 2020-11-30 RX ORDER — ONDANSETRON HYDROCHLORIDE 4 MG/1
TABLET, FILM COATED ORAL
Qty: 18 | Refills: 0 | Status: ACTIVE | COMMUNITY
Start: 2019-05-13

## 2020-11-30 RX ORDER — TRAZODONE HYDROCHLORIDE 100 MG/1
TABLET, FILM COATED ORAL
Qty: 90 | Refills: 0 | Status: ON HOLD | COMMUNITY
Start: 2019-03-27

## 2020-11-30 RX ORDER — RIZATRIPTAN BENZOATE 10 MG/1
TAKE 1 TABLET AT ONSET OF HEADACHE. MAY REPEAT EVERY 2 HOURS AS NEEDED. MAXIMUM 3 TABLETS IN 24 HOURS TABLET ORAL
Refills: 0 | Status: ON HOLD | COMMUNITY
Start: 2019-11-27

## 2020-11-30 RX ORDER — NITROFURANTOIN 50 MG/1
CAPSULE ORAL
Qty: 30 | Refills: 0 | Status: ON HOLD | COMMUNITY
Start: 2019-02-04

## 2020-11-30 RX ORDER — METOCLOPRAMIDE 10 MG/1
1 TABLET BEFORE MEALS TABLET ORAL TWICE A DAY
Qty: 60 TABLET | Refills: 3 | Status: ACTIVE | COMMUNITY
Start: 2020-08-27

## 2020-11-30 RX ORDER — DILTIAZEM HYDROCHLORIDE 120 MG/1
TK 1 C PO D CAPSULE, COATED, EXTENDED RELEASE ORAL
Qty: 90 | Refills: 0 | Status: ON HOLD | COMMUNITY
Start: 2020-02-04

## 2020-11-30 RX ORDER — DESVENLAFAXINE SUCCINATE 100 MG/1
TAKE 2 TABLET DAILY TABLET, EXTENDED RELEASE ORAL
Refills: 0 | Status: ACTIVE | COMMUNITY

## 2020-11-30 RX ORDER — SUCRALFATE 1 G/1
TK 1 T PO AC AND HS ON AN EMPTY STOMACH TABLET ORAL
Qty: 60 | Refills: 0 | Status: ON HOLD | COMMUNITY
Start: 2019-09-06

## 2020-11-30 RX ORDER — NYSTATIN 100000 [USP'U]/G
APPLY AA BID CREAM TOPICAL
Qty: 30 | Refills: 0 | Status: ON HOLD | COMMUNITY
Start: 2018-12-27

## 2020-11-30 RX ORDER — LISINOPRIL AND HYDROCHLOROTHIAZIDE TABLETS 20; 12.5 MG/1; MG/1
TAKE 1 TABLET DAILY TABLET ORAL
Refills: 0 | Status: ACTIVE | COMMUNITY

## 2020-11-30 RX ORDER — FAMOTIDINE 40 MG/1
TAKE 1 TABLET BY MOUTH AT BEDTIME TABLET ORAL
Qty: 90 | Refills: 3 | Status: ON HOLD | COMMUNITY
Start: 2020-07-01

## 2020-11-30 RX ORDER — FENOFIBRATE 145 MG/1
TABLET, FILM COATED ORAL
Qty: 30 | Refills: 0 | Status: ON HOLD | COMMUNITY
Start: 2019-09-17

## 2020-11-30 RX ORDER — PREGABALIN 75 MG/1
TAKE 1 CAPSULE AT BEDTIME CAPSULE ORAL
Refills: 0 | Status: ON HOLD | COMMUNITY
Start: 2020-02-17

## 2020-11-30 RX ORDER — CYCLOBENZAPRINE HYDROCHLORIDE 10 MG/1
1 TABLET AT BEDTIME AS NEEDED TABLET, FILM COATED ORAL ONCE A DAY
Status: ACTIVE | COMMUNITY

## 2020-11-30 RX ORDER — ONABOTULINUMTOXINA 50 [USP'U]/1
AS DIRECTED INJECTION, POWDER, LYOPHILIZED, FOR SOLUTION INTRAMUSCULAR
Status: ON HOLD | COMMUNITY

## 2020-11-30 RX ORDER — DEXLANSOPRAZOLE 60 MG/1
TAKE 1 CAPSULE DAILY EVERY MORNING BEFORE BREAKFAST CAPSULE, DELAYED RELEASE ORAL
Qty: 90 | Refills: 3 | Status: ON HOLD | COMMUNITY
Start: 2020-01-30

## 2020-11-30 RX ORDER — WHEAT DEXTRIN 3 G/3.5 G
AS DIRECTED POWDER (GRAM) ORAL
Status: ACTIVE | COMMUNITY

## 2020-11-30 RX ORDER — INSULIN ASPART 100 [IU]/ML
INJECT SUBCUTANEOUSLY AS DIRECTED INJECTION, SOLUTION INTRAVENOUS; SUBCUTANEOUS
Refills: 0 | Status: ACTIVE | COMMUNITY
Start: 2019-09-19

## 2020-11-30 RX ORDER — POLYETHYLENE GLYCOL 3350 17 G/17G
AS DIRECTED POWDER, FOR SOLUTION ORAL
Status: ACTIVE | COMMUNITY

## 2020-11-30 RX ORDER — PROMETHAZINE HYDROCHLORIDE 25 MG/ML
1 ML AS NEEDED INJECTION INTRAMUSCULAR; INTRAVENOUS
Status: ACTIVE | COMMUNITY

## 2020-11-30 RX ORDER — LEVOTHYROXINE SODIUM 50 UG/1
TAKE 1 TABLET DAILY TABLET ORAL
Refills: 0 | Status: ACTIVE | COMMUNITY

## 2020-11-30 NOTE — HPI-TODAY'S VISIT:
The patient is a very pleasant 60-year-old female who I followed for gastroparesis, peptic ulcer disease and colonoscopies for screening purposes who recently came in with abdominal pain and abnormal LFTs.  Transaminases were low-grade and around the 50 range.  Imaging studies showed dilated bile duct and pancreatic duct but EUS and MRCP were negative.  Patient maintains low-grade transaminase elevations on most recent laboratory testing last month.  Iron studies were normal.  Her last colonoscopy was December 2017.  This was unremarkable.  She had endoscopic ultrasound in March of this year.  This was unremarkable.  The MRI did reveal a 0.5 mm IPMN.  She was recently tried on Reglan for her nausea and epigastric pain symptoms.  Mild The patient is a very pleasant 60-year-old female who I followed for gastroparesis, peptic ulcer disease and colonoscopies for screening purposes who recently came in with abdominal pain and abnormal LFTs.  Transaminases were low-grade and around the 50 range.  Imaging studies showed dilated bile duct and pancreatic duct but EUS and MRCP were negative.  Patient maintains low-grade transaminase elevations on most recent laboratory testing last month.  Iron studies were normal.  Her last colonoscopy was December 2017.  This was unremarkable.  She had endoscopic ultrasound in March of this year.  This was unremarkable.  The MRI did reveal a 0.5 mm IPMN.  She was recently tried on Reglan for her nausea and epigastric pain symptoms.  Mild The patient continues to have postprandial abdominal pain.  She brings with me lab tests from last week which reveal a continued abnormal ALT at 34 but her AST is now returned to normal.  She describes the pain once again is epigastric that radiates to her back.  It is postprandial.  She recently has been considered to possibly have fibromyalgia.  She states her blood sugars have been under excellent control with a hemoglobin A1c down to 6.  She rarely takes the metoclopramide.  She takes her Dexilant about 4 times per week.  If she goes less than that she developed severe heartburn.  Even on this she occasionally gets regurgitation and heartburn.

## 2020-12-07 ENCOUNTER — WEB ENCOUNTER (OUTPATIENT)
Dept: URBAN - METROPOLITAN AREA CLINIC 113 | Facility: CLINIC | Age: 60
End: 2020-12-07

## 2020-12-29 ENCOUNTER — TELEPHONE ENCOUNTER (OUTPATIENT)
Dept: URBAN - METROPOLITAN AREA CLINIC 113 | Facility: CLINIC | Age: 60
End: 2020-12-29

## 2020-12-29 RX ORDER — DILTIAZEM HYDROCHLORIDE 120 MG/1
TK 1 C PO D CAPSULE, COATED, EXTENDED RELEASE ORAL
Qty: 90 | Refills: 0 | Status: ON HOLD | COMMUNITY
Start: 2020-02-04

## 2020-12-29 RX ORDER — ONDANSETRON HYDROCHLORIDE 4 MG/1
TABLET, FILM COATED ORAL
Qty: 18 | Refills: 0 | Status: ACTIVE | COMMUNITY
Start: 2019-05-13

## 2020-12-29 RX ORDER — ALPRAZOLAM 1 MG/1
TAKE 1 TABLET 3 TIMES DAILY AS NEEDED TABLET ORAL
Refills: 0 | Status: ACTIVE | COMMUNITY
Start: 2019-10-01

## 2020-12-29 RX ORDER — PROMETHAZINE HYDROCHLORIDE 25 MG/ML
1 ML AS NEEDED INJECTION INTRAMUSCULAR; INTRAVENOUS
Status: ACTIVE | COMMUNITY

## 2020-12-29 RX ORDER — DICYCLOMINE HYDROCHLORIDE 10 MG/1
TAKE 1 CAPSULE BY MOUTH EVERY 6 HOURS AS NEEDED CAPSULE ORAL
Qty: 60 | Refills: 0 | Status: ACTIVE | COMMUNITY

## 2020-12-29 RX ORDER — AMOXICILLIN AND CLAVULANATE POTASSIUM 875; 125 MG/1; MG/1
TABLET, FILM COATED ORAL
Qty: 14 | Refills: 0 | Status: ON HOLD | COMMUNITY
Start: 2019-11-07

## 2020-12-29 RX ORDER — TRAMADOL HYDROCHLORIDE 50 MG/1
1 TABLET AS NEEDED TABLET, FILM COATED ORAL ONCE A DAY
Status: ACTIVE | COMMUNITY

## 2020-12-29 RX ORDER — PROMETHAZINE HYDROCHLORIDE 25 MG/1
TAKE 1 TABLET EVERY 6 HOURS PRN NAUSEA TABLET ORAL
Refills: 3 | Status: ON HOLD | COMMUNITY
Start: 2019-11-27

## 2020-12-29 RX ORDER — DILTIAZEM HYDROCHLORIDE 120 MG/1
1 CAPSULE CAPSULE, COATED, EXTENDED RELEASE ORAL ONCE A DAY
Status: ACTIVE | COMMUNITY

## 2020-12-29 RX ORDER — DESVENLAFAXINE SUCCINATE 100 MG/1
TAKE 2 TABLET DAILY TABLET, EXTENDED RELEASE ORAL
Refills: 0 | Status: ACTIVE | COMMUNITY

## 2020-12-29 RX ORDER — RIZATRIPTAN BENZOATE 10 MG/1
TAKE 1 TABLET AT ONSET OF HEADACHE. MAY REPEAT EVERY 2 HOURS AS NEEDED. MAXIMUM 3 TABLETS IN 24 HOURS TABLET ORAL
Refills: 0 | Status: ON HOLD | COMMUNITY
Start: 2019-11-27

## 2020-12-29 RX ORDER — BLOOD SUGAR DIAGNOSTIC
STRIP MISCELLANEOUS
Qty: 400 | Refills: 0 | Status: ON HOLD | COMMUNITY
Start: 2019-05-28

## 2020-12-29 RX ORDER — METHYLPREDNISOLONE 4 MG/1
TABLET ORAL
Qty: 21 | Refills: 0 | Status: ON HOLD | COMMUNITY
Start: 2019-07-22

## 2020-12-29 RX ORDER — MIRABEGRON 50 MG/1
TAKE 1 TABLET DAILY TABLET, FILM COATED, EXTENDED RELEASE ORAL
Refills: 0 | Status: ON HOLD | COMMUNITY

## 2020-12-29 RX ORDER — SUCRALFATE 1 G/1
TK 1 T PO AC AND HS ON AN EMPTY STOMACH TABLET ORAL
Qty: 60 | Refills: 0 | Status: ON HOLD | COMMUNITY
Start: 2019-09-06

## 2020-12-29 RX ORDER — FENOFIBRATE 145 MG/1
TABLET, FILM COATED ORAL
Qty: 30 | Refills: 0 | Status: ON HOLD | COMMUNITY
Start: 2019-09-17

## 2020-12-29 RX ORDER — PREGABALIN 75 MG/1
TAKE 1 CAPSULE AT BEDTIME CAPSULE ORAL
Refills: 0 | Status: ON HOLD | COMMUNITY
Start: 2020-02-17

## 2020-12-29 RX ORDER — CANAGLIFLOZIN 100 MG/1
TAKE 1 TABLET BY MOUTH ONCE DAILY 30 MINS BEFORE BREAKFAST TABLET, FILM COATED ORAL
Refills: 0 | Status: ACTIVE | COMMUNITY
Start: 2020-01-13

## 2020-12-29 RX ORDER — WHEAT DEXTRIN 3 G/3.5 G
AS DIRECTED POWDER (GRAM) ORAL
Status: ACTIVE | COMMUNITY

## 2020-12-29 RX ORDER — NYSTATIN 100000 [USP'U]/G
APPLY AA BID CREAM TOPICAL
Qty: 30 | Refills: 0 | Status: ON HOLD | COMMUNITY
Start: 2018-12-27

## 2020-12-29 RX ORDER — INSULIN ASPART 100 [IU]/ML
ADM UP TO 80 UNI VIA INSULIN PUMP D INJECTION, SOLUTION INTRAVENOUS; SUBCUTANEOUS
Qty: 20 | Refills: 0 | Status: ON HOLD | COMMUNITY
Start: 2020-02-20

## 2020-12-29 RX ORDER — LISINOPRIL AND HYDROCHLOROTHIAZIDE TABLETS 20; 12.5 MG/1; MG/1
TAKE 1 TABLET DAILY TABLET ORAL
Refills: 0 | Status: ACTIVE | COMMUNITY

## 2020-12-29 RX ORDER — FAMOTIDINE 40 MG/1
TAKE 1 TABLET BY MOUTH AT BEDTIME TABLET ORAL
Qty: 90 | Refills: 3 | Status: ACTIVE | COMMUNITY
Start: 2020-07-01

## 2020-12-29 RX ORDER — POLYETHYLENE GLYCOL 3350 17 G/17G
AS DIRECTED POWDER, FOR SOLUTION ORAL
Status: ACTIVE | COMMUNITY

## 2020-12-29 RX ORDER — DICYCLOMINE HYDROCHLORIDE 10 MG/1
1 CAPSULE CAPSULE ORAL EVERY 6 HOURS
Qty: 60 | Refills: 1 | OUTPATIENT
Start: 2020-12-29 | End: 2021-02-27

## 2020-12-29 RX ORDER — AZITHROMYCIN DIHYDRATE 250 MG/1
TABLET, FILM COATED ORAL
Qty: 6 | Refills: 0 | Status: ON HOLD | COMMUNITY
Start: 2019-12-04

## 2020-12-29 RX ORDER — FLUCONAZOLE 150 MG/1
TABLET ORAL
Qty: 4 | Refills: 0 | Status: ON HOLD | COMMUNITY
Start: 2019-06-25

## 2020-12-29 RX ORDER — DEXLANSOPRAZOLE 60 MG/1
1 CAPSULE CAPSULE, DELAYED RELEASE ORAL ONCE A DAY
Status: ACTIVE | COMMUNITY

## 2020-12-29 RX ORDER — CYCLOBENZAPRINE HYDROCHLORIDE 10 MG/1
1 TABLET AT BEDTIME AS NEEDED TABLET, FILM COATED ORAL ONCE A DAY
Status: ACTIVE | COMMUNITY

## 2020-12-29 RX ORDER — METOCLOPRAMIDE 10 MG/1
1 TABLET BEFORE MEALS TABLET ORAL TWICE A DAY
Qty: 60 TABLET | Refills: 3 | Status: ACTIVE | COMMUNITY
Start: 2020-08-27

## 2020-12-29 RX ORDER — NITROFURANTOIN 50 MG/1
CAPSULE ORAL
Qty: 30 | Refills: 0 | Status: ON HOLD | COMMUNITY
Start: 2019-02-04

## 2020-12-29 RX ORDER — ONABOTULINUMTOXINA 50 [USP'U]/1
AS DIRECTED INJECTION, POWDER, LYOPHILIZED, FOR SOLUTION INTRAMUSCULAR
Status: ACTIVE | COMMUNITY

## 2020-12-29 RX ORDER — METHENAMINE, SODIUM PHOSPHATE, MONOBASIC, MONOHYDRATE, PHENYL SALICYLATE, METHYLENE BLUE, AND HYOSCYAMINE SULFATE 118; 40.8; 36; 10; .12 MG/1; MG/1; MG/1; MG/1; MG/1
1 CAPSULE CAPSULE ORAL
Status: ACTIVE | COMMUNITY

## 2020-12-29 RX ORDER — CEPHALEXIN 500 MG/1
TK 1 C PO Q 12 H CAPSULE ORAL
Qty: 14 | Refills: 0 | Status: ON HOLD | COMMUNITY
Start: 2019-09-06

## 2020-12-29 RX ORDER — INSULIN ASPART 100 [IU]/ML
INJECT SUBCUTANEOUSLY AS DIRECTED INJECTION, SOLUTION INTRAVENOUS; SUBCUTANEOUS
Refills: 0 | Status: ON HOLD | COMMUNITY
Start: 2019-09-19

## 2020-12-29 RX ORDER — PREGABALIN 75 MG/1
1 CAPSULE CAPSULE ORAL ONCE A DAY
Status: ACTIVE | COMMUNITY

## 2020-12-29 RX ORDER — DEXLANSOPRAZOLE 60 MG/1
TAKE 1 CAPSULE DAILY EVERY MORNING BEFORE BREAKFAST CAPSULE, DELAYED RELEASE ORAL
Qty: 90 | Refills: 3 | Status: ON HOLD | COMMUNITY
Start: 2020-01-30

## 2020-12-29 RX ORDER — MIRABEGRON 50 MG/1
1 TABLET TABLET, FILM COATED, EXTENDED RELEASE ORAL ONCE A DAY
Status: ACTIVE | COMMUNITY

## 2020-12-29 RX ORDER — HYDROXYZINE HYDROCHLORIDE 25 MG/1
TAKE 1 TABLET EVERY 8 HOURS PRN ITCHING TABLET, FILM COATED ORAL
Qty: 90 | Refills: 2 | Status: ON HOLD | COMMUNITY
Start: 2019-10-02

## 2020-12-29 RX ORDER — BLOOD SUGAR DIAGNOSTIC
TEST BLOOD SUGAR 6 TIMES D STRIP MISCELLANEOUS
Qty: 150 | Refills: 0 | Status: ON HOLD | COMMUNITY
Start: 2020-02-20

## 2020-12-29 RX ORDER — ZOLPIDEM TARTRATE 10 MG/1
TK 1 T PO  QHS PRN TABLET, FILM COATED ORAL
Qty: 30 | Refills: 0 | Status: ACTIVE | COMMUNITY
Start: 2020-03-19

## 2020-12-29 RX ORDER — INSULIN DEGLUDEC INJECTION 100 U/ML
INJECT 55 UNITS SC QAM INJECTION, SOLUTION SUBCUTANEOUS
Qty: 9 | Refills: 0 | Status: ON HOLD | COMMUNITY
Start: 2019-08-29

## 2020-12-29 RX ORDER — ROSUVASTATIN CALCIUM 40 MG/1
TAKE 1 TABLET DAILY TABLET, FILM COATED ORAL
Refills: 0 | Status: ON HOLD | COMMUNITY

## 2020-12-29 RX ORDER — TRAZODONE HYDROCHLORIDE 100 MG/1
TABLET, FILM COATED ORAL
Qty: 90 | Refills: 0 | Status: ON HOLD | COMMUNITY
Start: 2019-03-27

## 2020-12-29 RX ORDER — INSULIN GLARGINE AND LIXISENATIDE 100; 33 U/ML; UG/ML
INJECTION, SOLUTION SUBCUTANEOUS
Qty: 3 | Refills: 0 | Status: ON HOLD | COMMUNITY
Start: 2019-05-28

## 2020-12-29 RX ORDER — EZETIMIBE 10 MG/1
1 TABLET TABLET ORAL ONCE A DAY
Status: ACTIVE | COMMUNITY

## 2020-12-29 RX ORDER — LEVOTHYROXINE SODIUM 50 UG/1
TAKE 1 TABLET DAILY TABLET ORAL
Refills: 0 | Status: ACTIVE | COMMUNITY

## 2020-12-29 RX ORDER — AMOXICILLIN 500 MG/1
CAPSULE ORAL
Qty: 15 | Refills: 0 | Status: ON HOLD | COMMUNITY
Start: 2019-07-01

## 2021-01-25 ENCOUNTER — TELEPHONE ENCOUNTER (OUTPATIENT)
Dept: URBAN - METROPOLITAN AREA CLINIC 113 | Facility: CLINIC | Age: 61
End: 2021-01-25

## 2021-01-25 RX ORDER — MIRABEGRON 50 MG/1
1 TABLET TABLET, FILM COATED, EXTENDED RELEASE ORAL ONCE A DAY
Status: ACTIVE | COMMUNITY

## 2021-01-25 RX ORDER — LEVOTHYROXINE SODIUM 50 UG/1
TAKE 1 TABLET DAILY TABLET ORAL
Refills: 0 | Status: ACTIVE | COMMUNITY

## 2021-01-25 RX ORDER — LEVONORGESTREL/ETHIN.ESTRADIOL 0.15-0.03
1 TABLET IN THE MORNING TABLET ORAL ONCE A DAY
Status: ACTIVE | COMMUNITY

## 2021-01-25 RX ORDER — INSULIN GLARGINE AND LIXISENATIDE 100; 33 U/ML; UG/ML
INJECTION, SOLUTION SUBCUTANEOUS
Qty: 3 | Refills: 0 | Status: ON HOLD | COMMUNITY
Start: 2019-05-28

## 2021-01-25 RX ORDER — PREGABALIN 75 MG/1
1 CAPSULE CAPSULE ORAL ONCE A DAY
Status: ACTIVE | COMMUNITY

## 2021-01-25 RX ORDER — DICYCLOMINE HYDROCHLORIDE 10 MG/1
1 CAPSULE CAPSULE ORAL EVERY 6 HOURS
Qty: 60 | Refills: 1 | Status: ACTIVE | COMMUNITY
Start: 2020-12-29 | End: 2021-02-27

## 2021-01-25 RX ORDER — PROMETHAZINE HYDROCHLORIDE 25 MG/ML
1 ML AS NEEDED INJECTION INTRAMUSCULAR; INTRAVENOUS
Status: ACTIVE | COMMUNITY

## 2021-01-25 RX ORDER — DEXLANSOPRAZOLE 60 MG/1
TAKE 1 CAPSULE DAILY EVERY MORNING BEFORE BREAKFAST CAPSULE, DELAYED RELEASE ORAL
Qty: 90 | Refills: 3 | Status: ON HOLD | COMMUNITY
Start: 2020-01-30

## 2021-01-25 RX ORDER — METHENAMINE, SODIUM PHOSPHATE, MONOBASIC, MONOHYDRATE, PHENYL SALICYLATE, METHYLENE BLUE, AND HYOSCYAMINE SULFATE 118; 40.8; 36; 10; .12 MG/1; MG/1; MG/1; MG/1; MG/1
1 CAPSULE CAPSULE ORAL
Status: ACTIVE | COMMUNITY

## 2021-01-25 RX ORDER — FENOFIBRATE 145 MG/1
TABLET, FILM COATED ORAL
Qty: 30 | Refills: 0 | Status: ON HOLD | COMMUNITY
Start: 2019-09-17

## 2021-01-25 RX ORDER — TRAMADOL HYDROCHLORIDE 50 MG/1
1 TABLET AS NEEDED TABLET, FILM COATED ORAL ONCE A DAY
Status: ACTIVE | COMMUNITY

## 2021-01-25 RX ORDER — ICOSAPENT ETHYL 1000 MG/1
2 CAPSULES WITH MEALS CAPSULE ORAL TWICE A DAY
Status: ACTIVE | COMMUNITY

## 2021-01-25 RX ORDER — ONDANSETRON HYDROCHLORIDE 4 MG/1
TABLET, FILM COATED ORAL
Qty: 18 | Refills: 0 | Status: ACTIVE | COMMUNITY
Start: 2019-05-13

## 2021-01-25 RX ORDER — CEPHALEXIN 500 MG/1
TK 1 C PO Q 12 H CAPSULE ORAL
Qty: 14 | Refills: 0 | Status: ON HOLD | COMMUNITY
Start: 2019-09-06

## 2021-01-25 RX ORDER — HYDROXYZINE HYDROCHLORIDE 25 MG/1
TAKE 1 TABLET EVERY 8 HOURS PRN ITCHING TABLET, FILM COATED ORAL
Qty: 90 | Refills: 2 | Status: ON HOLD | COMMUNITY
Start: 2019-10-02

## 2021-01-25 RX ORDER — DEXLANSOPRAZOLE 60 MG/1
1 CAPSULE CAPSULE, DELAYED RELEASE ORAL ONCE A DAY
Qty: 90 CAPSULE | Refills: 1 | OUTPATIENT
Start: 2021-01-25

## 2021-01-25 RX ORDER — BLOOD SUGAR DIAGNOSTIC
TEST BLOOD SUGAR 6 TIMES D STRIP MISCELLANEOUS
Qty: 150 | Refills: 0 | Status: ON HOLD | COMMUNITY
Start: 2020-02-20

## 2021-01-25 RX ORDER — BLOOD SUGAR DIAGNOSTIC
STRIP MISCELLANEOUS
Qty: 400 | Refills: 0 | Status: ON HOLD | COMMUNITY
Start: 2019-05-28

## 2021-01-25 RX ORDER — DICYCLOMINE HYDROCHLORIDE 10 MG/1
TAKE 1 CAPSULE BY MOUTH EVERY 6 HOURS AS NEEDED CAPSULE ORAL
Qty: 60 | Refills: 0 | Status: ACTIVE | COMMUNITY

## 2021-01-25 RX ORDER — NITROFURANTOIN 50 MG/1
CAPSULE ORAL
Qty: 30 | Refills: 0 | Status: ON HOLD | COMMUNITY
Start: 2019-02-04

## 2021-01-25 RX ORDER — INSULIN ASPART 100 [IU]/ML
INJECT SUBCUTANEOUSLY AS DIRECTED INJECTION, SOLUTION INTRAVENOUS; SUBCUTANEOUS
Refills: 0 | Status: ON HOLD | COMMUNITY
Start: 2019-09-19

## 2021-01-25 RX ORDER — POLYETHYLENE GLYCOL 3350 17 G/17G
AS DIRECTED POWDER, FOR SOLUTION ORAL
Status: ACTIVE | COMMUNITY

## 2021-01-25 RX ORDER — ALPRAZOLAM 1 MG/1
TAKE 1 TABLET 3 TIMES DAILY AS NEEDED TABLET ORAL
Refills: 0 | Status: ACTIVE | COMMUNITY
Start: 2019-10-01

## 2021-01-25 RX ORDER — AMOXICILLIN AND CLAVULANATE POTASSIUM 875; 125 MG/1; MG/1
TABLET, FILM COATED ORAL
Qty: 14 | Refills: 0 | Status: ON HOLD | COMMUNITY
Start: 2019-11-07

## 2021-01-25 RX ORDER — FAMOTIDINE 40 MG/1
TAKE 1 TABLET BY MOUTH AT BEDTIME TABLET ORAL
Qty: 90 | Refills: 3 | Status: ACTIVE | COMMUNITY
Start: 2020-07-01

## 2021-01-25 RX ORDER — INSULIN ASPART 100 [IU]/ML
ADM UP TO 80 UNI VIA INSULIN PUMP D INJECTION, SOLUTION INTRAVENOUS; SUBCUTANEOUS
Qty: 20 | Refills: 0 | Status: ON HOLD | COMMUNITY
Start: 2020-02-20

## 2021-01-25 RX ORDER — MIRABEGRON 50 MG/1
TAKE 1 TABLET DAILY TABLET, FILM COATED, EXTENDED RELEASE ORAL
Refills: 0 | Status: ON HOLD | COMMUNITY

## 2021-01-25 RX ORDER — DILTIAZEM HYDROCHLORIDE 120 MG/1
TK 1 C PO D CAPSULE, COATED, EXTENDED RELEASE ORAL
Qty: 90 | Refills: 0 | Status: ON HOLD | COMMUNITY
Start: 2020-02-04

## 2021-01-25 RX ORDER — AZITHROMYCIN DIHYDRATE 250 MG/1
TABLET, FILM COATED ORAL
Qty: 6 | Refills: 0 | Status: ON HOLD | COMMUNITY
Start: 2019-12-04

## 2021-01-25 RX ORDER — PREGABALIN 75 MG/1
TAKE 1 CAPSULE AT BEDTIME CAPSULE ORAL
Refills: 0 | Status: ON HOLD | COMMUNITY
Start: 2020-02-17

## 2021-01-25 RX ORDER — INSULIN DEGLUDEC INJECTION 100 U/ML
INJECT 55 UNITS SC QAM INJECTION, SOLUTION SUBCUTANEOUS
Qty: 9 | Refills: 0 | Status: ON HOLD | COMMUNITY
Start: 2019-08-29

## 2021-01-25 RX ORDER — CANAGLIFLOZIN 100 MG/1
TAKE 1 TABLET BY MOUTH ONCE DAILY 30 MINS BEFORE BREAKFAST TABLET, FILM COATED ORAL
Refills: 0 | Status: ACTIVE | COMMUNITY
Start: 2020-01-13

## 2021-01-25 RX ORDER — CYCLOBENZAPRINE HYDROCHLORIDE 10 MG/1
1 TABLET AT BEDTIME AS NEEDED TABLET, FILM COATED ORAL ONCE A DAY
Status: ACTIVE | COMMUNITY

## 2021-01-25 RX ORDER — RIZATRIPTAN BENZOATE 10 MG/1
TAKE 1 TABLET AT ONSET OF HEADACHE. MAY REPEAT EVERY 2 HOURS AS NEEDED. MAXIMUM 3 TABLETS IN 24 HOURS TABLET ORAL
Refills: 0 | Status: ON HOLD | COMMUNITY
Start: 2019-11-27

## 2021-01-25 RX ORDER — DILTIAZEM HYDROCHLORIDE 120 MG/1
1 CAPSULE CAPSULE, COATED, EXTENDED RELEASE ORAL ONCE A DAY
Status: ACTIVE | COMMUNITY

## 2021-01-25 RX ORDER — SUCRALFATE 1 G/1
TK 1 T PO AC AND HS ON AN EMPTY STOMACH TABLET ORAL
Qty: 60 | Refills: 0 | Status: ON HOLD | COMMUNITY
Start: 2019-09-06

## 2021-01-25 RX ORDER — FLUCONAZOLE 150 MG/1
TABLET ORAL
Qty: 4 | Refills: 0 | Status: ON HOLD | COMMUNITY
Start: 2019-06-25

## 2021-01-25 RX ORDER — AMOXICILLIN 500 MG/1
CAPSULE ORAL
Qty: 15 | Refills: 0 | Status: ON HOLD | COMMUNITY
Start: 2019-07-01

## 2021-01-25 RX ORDER — ONABOTULINUMTOXINA 50 [USP'U]/1
AS DIRECTED INJECTION, POWDER, LYOPHILIZED, FOR SOLUTION INTRAMUSCULAR
Status: ACTIVE | COMMUNITY

## 2021-01-25 RX ORDER — WHEAT DEXTRIN 3 G/3.5 G
AS DIRECTED POWDER (GRAM) ORAL
Status: ACTIVE | COMMUNITY

## 2021-01-25 RX ORDER — METOCLOPRAMIDE 10 MG/1
1 TABLET BEFORE MEALS TABLET ORAL TWICE A DAY
Qty: 60 TABLET | Refills: 3 | Status: ACTIVE | COMMUNITY
Start: 2020-08-27

## 2021-01-25 RX ORDER — NYSTATIN 100000 [USP'U]/G
APPLY AA BID CREAM TOPICAL
Qty: 30 | Refills: 0 | Status: ON HOLD | COMMUNITY
Start: 2018-12-27

## 2021-01-25 RX ORDER — TRAZODONE HYDROCHLORIDE 100 MG/1
TABLET, FILM COATED ORAL
Qty: 90 | Refills: 0 | Status: ON HOLD | COMMUNITY
Start: 2019-03-27

## 2021-01-25 RX ORDER — ZOLPIDEM TARTRATE 10 MG/1
TK 1 T PO  QHS PRN TABLET, FILM COATED ORAL
Qty: 30 | Refills: 0 | Status: ACTIVE | COMMUNITY
Start: 2020-03-19

## 2021-01-25 RX ORDER — PROMETHAZINE HYDROCHLORIDE 25 MG/1
TAKE 1 TABLET EVERY 6 HOURS PRN NAUSEA TABLET ORAL
Refills: 3 | Status: ON HOLD | COMMUNITY
Start: 2019-11-27

## 2021-01-25 RX ORDER — DESVENLAFAXINE SUCCINATE 100 MG/1
TAKE 2 TABLET DAILY TABLET, EXTENDED RELEASE ORAL
Refills: 0 | Status: ACTIVE | COMMUNITY

## 2021-01-25 RX ORDER — METHYLPREDNISOLONE 4 MG/1
TABLET ORAL
Qty: 21 | Refills: 0 | Status: ON HOLD | COMMUNITY
Start: 2019-07-22

## 2021-01-25 RX ORDER — ROSUVASTATIN CALCIUM 40 MG/1
TAKE 1 TABLET DAILY TABLET, FILM COATED ORAL
Refills: 0 | Status: ON HOLD | COMMUNITY

## 2021-01-25 RX ORDER — ASPIRIN 81 MG/1
1 TABLET TABLET ORAL ONCE A DAY
Status: ACTIVE | COMMUNITY

## 2021-01-25 RX ORDER — LISINOPRIL AND HYDROCHLOROTHIAZIDE TABLETS 20; 12.5 MG/1; MG/1
TAKE 1 TABLET DAILY TABLET ORAL
Refills: 0 | Status: ACTIVE | COMMUNITY

## 2021-01-25 RX ORDER — EZETIMIBE 10 MG/1
1 TABLET TABLET ORAL ONCE A DAY
Status: ACTIVE | COMMUNITY

## 2021-01-25 RX ORDER — DEXLANSOPRAZOLE 60 MG/1
1 CAPSULE CAPSULE, DELAYED RELEASE ORAL ONCE A DAY
Status: ACTIVE | COMMUNITY

## 2021-02-02 ENCOUNTER — OFFICE VISIT (OUTPATIENT)
Dept: URBAN - METROPOLITAN AREA CLINIC 113 | Facility: CLINIC | Age: 61
End: 2021-02-02

## 2021-04-02 ENCOUNTER — OFFICE VISIT (OUTPATIENT)
Dept: URBAN - METROPOLITAN AREA CLINIC 113 | Facility: CLINIC | Age: 61
End: 2021-04-02
Payer: COMMERCIAL

## 2021-04-02 VITALS
WEIGHT: 178 LBS | SYSTOLIC BLOOD PRESSURE: 122 MMHG | BODY MASS INDEX: 31.54 KG/M2 | HEIGHT: 63 IN | RESPIRATION RATE: 18 BRPM | TEMPERATURE: 97.8 F | DIASTOLIC BLOOD PRESSURE: 83 MMHG | HEART RATE: 106 BPM

## 2021-04-02 DIAGNOSIS — R74.8 ABNORMAL LIVER ENZYMES: ICD-10-CM

## 2021-04-02 DIAGNOSIS — E11.43 TYPE 2 DIABETES MELLITUS WITH DIABETIC AUTONOMIC (POLY)NEUROPATHY: ICD-10-CM

## 2021-04-02 DIAGNOSIS — K83.8 DILATED BILE DUCT: ICD-10-CM

## 2021-04-02 DIAGNOSIS — D49.0 IPMN (INTRADUCTAL PAPILLARY MUCINOUS NEOPLASM): ICD-10-CM

## 2021-04-02 DIAGNOSIS — K21.9 GASTROESOPHAGEAL REFLUX DISEASE WITHOUT ESOPHAGITIS: ICD-10-CM

## 2021-04-02 DIAGNOSIS — R11.0 NAUSEA: ICD-10-CM

## 2021-04-02 DIAGNOSIS — R10.13 EPIGASTRIC PAIN: ICD-10-CM

## 2021-04-02 PROCEDURE — 99213 OFFICE O/P EST LOW 20 MIN: CPT | Performed by: INTERNAL MEDICINE

## 2021-04-02 RX ORDER — MIRABEGRON 50 MG/1
1 TABLET TABLET, FILM COATED, EXTENDED RELEASE ORAL ONCE A DAY
Status: ACTIVE | COMMUNITY

## 2021-04-02 RX ORDER — PREGABALIN 75 MG/1
1 CAPSULE CAPSULE ORAL ONCE A DAY
Status: ACTIVE | COMMUNITY

## 2021-04-02 RX ORDER — BLOOD SUGAR DIAGNOSTIC
STRIP MISCELLANEOUS
Qty: 400 | Refills: 0 | Status: ON HOLD | COMMUNITY
Start: 2019-05-28

## 2021-04-02 RX ORDER — METHENAMINE, SODIUM PHOSPHATE, MONOBASIC, MONOHYDRATE, PHENYL SALICYLATE, METHYLENE BLUE, AND HYOSCYAMINE SULFATE 118; 40.8; 36; 10; .12 MG/1; MG/1; MG/1; MG/1; MG/1
1 CAPSULE CAPSULE ORAL
Status: ACTIVE | COMMUNITY

## 2021-04-02 RX ORDER — DEXLANSOPRAZOLE 60 MG/1
TAKE 1 CAPSULE DAILY EVERY MORNING BEFORE BREAKFAST CAPSULE, DELAYED RELEASE ORAL
Qty: 90 | Refills: 3 | Status: ON HOLD | COMMUNITY
Start: 2020-01-30

## 2021-04-02 RX ORDER — NYSTATIN 100000 [USP'U]/G
APPLY AA BID CREAM TOPICAL
Qty: 30 | Refills: 0 | Status: ON HOLD | COMMUNITY
Start: 2018-12-27

## 2021-04-02 RX ORDER — PROMETHAZINE HYDROCHLORIDE 25 MG/ML
1 ML AS NEEDED INJECTION INTRAMUSCULAR; INTRAVENOUS
Status: ACTIVE | COMMUNITY

## 2021-04-02 RX ORDER — RIZATRIPTAN BENZOATE 10 MG/1
TAKE 1 TABLET AT ONSET OF HEADACHE. MAY REPEAT EVERY 2 HOURS AS NEEDED. MAXIMUM 3 TABLETS IN 24 HOURS TABLET ORAL
Refills: 0 | Status: ON HOLD | COMMUNITY
Start: 2019-11-27

## 2021-04-02 RX ORDER — WHEAT DEXTRIN 3 G/3.5 G
AS DIRECTED POWDER (GRAM) ORAL
Status: ACTIVE | COMMUNITY

## 2021-04-02 RX ORDER — FLUCONAZOLE 150 MG/1
TABLET ORAL
Qty: 4 | Refills: 0 | Status: ON HOLD | COMMUNITY
Start: 2019-06-25

## 2021-04-02 RX ORDER — ONDANSETRON HYDROCHLORIDE 4 MG/1
TABLET, FILM COATED ORAL
Qty: 18 | Refills: 0 | Status: ACTIVE | COMMUNITY
Start: 2019-05-13

## 2021-04-02 RX ORDER — INSULIN GLARGINE AND LIXISENATIDE 100; 33 U/ML; UG/ML
INJECTION, SOLUTION SUBCUTANEOUS
Qty: 3 | Refills: 0 | Status: ON HOLD | COMMUNITY
Start: 2019-05-28

## 2021-04-02 RX ORDER — DILTIAZEM HYDROCHLORIDE 120 MG/1
1 CAPSULE CAPSULE, COATED, EXTENDED RELEASE ORAL ONCE A DAY
Status: ACTIVE | COMMUNITY

## 2021-04-02 RX ORDER — CYCLOBENZAPRINE HYDROCHLORIDE 10 MG/1
1 TABLET AT BEDTIME AS NEEDED TABLET, FILM COATED ORAL ONCE A DAY
Status: ACTIVE | COMMUNITY

## 2021-04-02 RX ORDER — PROMETHAZINE HYDROCHLORIDE 25 MG/1
TAKE 1 TABLET EVERY 6 HOURS PRN NAUSEA TABLET ORAL
Refills: 3 | Status: ON HOLD | COMMUNITY
Start: 2019-11-27

## 2021-04-02 RX ORDER — ONABOTULINUMTOXINA 50 [USP'U]/1
AS DIRECTED INJECTION, POWDER, LYOPHILIZED, FOR SOLUTION INTRAMUSCULAR
Status: ACTIVE | COMMUNITY

## 2021-04-02 RX ORDER — MIRABEGRON 50 MG/1
TAKE 1 TABLET DAILY TABLET, FILM COATED, EXTENDED RELEASE ORAL
Refills: 0 | Status: ON HOLD | COMMUNITY

## 2021-04-02 RX ORDER — DEXLANSOPRAZOLE 60 MG/1
1 CAPSULE CAPSULE, DELAYED RELEASE ORAL ONCE A DAY
Qty: 90 CAPSULE | Refills: 1 | Status: ACTIVE | COMMUNITY
Start: 2021-01-25

## 2021-04-02 RX ORDER — LEVONORGESTREL/ETHIN.ESTRADIOL 0.15-0.03
1 TABLET IN THE MORNING TABLET ORAL ONCE A DAY
Status: ON HOLD | COMMUNITY

## 2021-04-02 RX ORDER — CANAGLIFLOZIN 100 MG/1
TAKE 1 TABLET BY MOUTH ONCE DAILY 30 MINS BEFORE BREAKFAST TABLET, FILM COATED ORAL
Refills: 0 | Status: ACTIVE | COMMUNITY
Start: 2020-01-13

## 2021-04-02 RX ORDER — EZETIMIBE 10 MG/1
1 TABLET TABLET ORAL ONCE A DAY
Status: ACTIVE | COMMUNITY

## 2021-04-02 RX ORDER — DESVENLAFAXINE SUCCINATE 100 MG/1
TAKE 2 TABLET DAILY TABLET, EXTENDED RELEASE ORAL
Refills: 0 | Status: ON HOLD | COMMUNITY

## 2021-04-02 RX ORDER — DEXLANSOPRAZOLE 60 MG/1
1 CAPSULE CAPSULE, DELAYED RELEASE ORAL ONCE A DAY
Status: ACTIVE | COMMUNITY

## 2021-04-02 RX ORDER — METOCLOPRAMIDE 10 MG/1
1 TABLET BEFORE MEALS TABLET ORAL TWICE A DAY
Qty: 60 TABLET | Refills: 3 | Status: ON HOLD | COMMUNITY
Start: 2020-08-27

## 2021-04-02 RX ORDER — NITROFURANTOIN 50 MG/1
CAPSULE ORAL
Qty: 30 | Refills: 0 | Status: ON HOLD | COMMUNITY
Start: 2019-02-04

## 2021-04-02 RX ORDER — TRAMADOL HYDROCHLORIDE 50 MG/1
1 TABLET AS NEEDED TABLET, FILM COATED ORAL ONCE A DAY
Status: ACTIVE | COMMUNITY

## 2021-04-02 RX ORDER — AZITHROMYCIN DIHYDRATE 250 MG/1
TABLET, FILM COATED ORAL
Qty: 6 | Refills: 0 | Status: ON HOLD | COMMUNITY
Start: 2019-12-04

## 2021-04-02 RX ORDER — CEPHALEXIN 500 MG/1
TK 1 C PO Q 12 H CAPSULE ORAL
Qty: 14 | Refills: 0 | Status: ON HOLD | COMMUNITY
Start: 2019-09-06

## 2021-04-02 RX ORDER — DILTIAZEM HYDROCHLORIDE 120 MG/1
TK 1 C PO D CAPSULE, COATED, EXTENDED RELEASE ORAL
Qty: 90 | Refills: 0 | Status: ON HOLD | COMMUNITY
Start: 2020-02-04

## 2021-04-02 RX ORDER — MODAFINIL 200 MG/1
1 TABLET IN THE MORNING TABLET ORAL ONCE A DAY
Status: ACTIVE | COMMUNITY

## 2021-04-02 RX ORDER — BLOOD SUGAR DIAGNOSTIC
TEST BLOOD SUGAR 6 TIMES D STRIP MISCELLANEOUS
Qty: 150 | Refills: 0 | Status: ON HOLD | COMMUNITY
Start: 2020-02-20

## 2021-04-02 RX ORDER — POLYETHYLENE GLYCOL 3350 17 G/17G
AS DIRECTED POWDER, FOR SOLUTION ORAL
Status: ACTIVE | COMMUNITY

## 2021-04-02 RX ORDER — TRAZODONE HYDROCHLORIDE 100 MG/1
TABLET, FILM COATED ORAL
Qty: 90 | Refills: 0 | Status: ON HOLD | COMMUNITY
Start: 2019-03-27

## 2021-04-02 RX ORDER — INSULIN DEGLUDEC INJECTION 100 U/ML
INJECT 55 UNITS SC QAM INJECTION, SOLUTION SUBCUTANEOUS
Qty: 9 | Refills: 0 | Status: ON HOLD | COMMUNITY
Start: 2019-08-29

## 2021-04-02 RX ORDER — FLUCONAZOLE 150 MG/1
1 TABLET TABLET ORAL
Status: ACTIVE | COMMUNITY

## 2021-04-02 RX ORDER — ALPRAZOLAM 1 MG/1
TAKE 1 TABLET 3 TIMES DAILY AS NEEDED TABLET ORAL
Refills: 0 | Status: ACTIVE | COMMUNITY
Start: 2019-10-01

## 2021-04-02 RX ORDER — AMOXICILLIN AND CLAVULANATE POTASSIUM 875; 125 MG/1; MG/1
TABLET, FILM COATED ORAL
Qty: 14 | Refills: 0 | Status: ON HOLD | COMMUNITY
Start: 2019-11-07

## 2021-04-02 RX ORDER — LISINOPRIL AND HYDROCHLOROTHIAZIDE TABLETS 20; 12.5 MG/1; MG/1
TAKE 1 TABLET DAILY TABLET ORAL
Refills: 0 | Status: ACTIVE | COMMUNITY

## 2021-04-02 RX ORDER — HYDROXYZINE HYDROCHLORIDE 25 MG/1
TAKE 1 TABLET EVERY 8 HOURS PRN ITCHING TABLET, FILM COATED ORAL
Qty: 90 | Refills: 2 | Status: ON HOLD | COMMUNITY
Start: 2019-10-02

## 2021-04-02 RX ORDER — ROSUVASTATIN CALCIUM 40 MG/1
TAKE 1 TABLET DAILY TABLET, FILM COATED ORAL
Refills: 0 | Status: ON HOLD | COMMUNITY

## 2021-04-02 RX ORDER — ZOLPIDEM TARTRATE 10 MG/1
TK 1 T PO  QHS PRN TABLET, FILM COATED ORAL
Qty: 30 | Refills: 0 | Status: ACTIVE | COMMUNITY
Start: 2020-03-19

## 2021-04-02 RX ORDER — INSULIN ASPART 100 [IU]/ML
INJECT SUBCUTANEOUSLY AS DIRECTED INJECTION, SOLUTION INTRAVENOUS; SUBCUTANEOUS
Refills: 0 | Status: ON HOLD | COMMUNITY
Start: 2019-09-19

## 2021-04-02 RX ORDER — FENOFIBRATE 145 MG/1
TABLET, FILM COATED ORAL
Qty: 30 | Refills: 0 | Status: ON HOLD | COMMUNITY
Start: 2019-09-17

## 2021-04-02 RX ORDER — ASPIRIN 81 MG/1
1 TABLET TABLET ORAL ONCE A DAY
Status: ACTIVE | COMMUNITY

## 2021-04-02 RX ORDER — ICOSAPENT ETHYL 1000 MG/1
2 CAPSULES WITH MEALS CAPSULE ORAL TWICE A DAY
Status: ACTIVE | COMMUNITY

## 2021-04-02 RX ORDER — SUCRALFATE 1 G/1
TK 1 T PO AC AND HS ON AN EMPTY STOMACH TABLET ORAL
Qty: 60 | Refills: 0 | Status: ON HOLD | COMMUNITY
Start: 2019-09-06

## 2021-04-02 RX ORDER — LEVOTHYROXINE SODIUM 50 UG/1
TAKE 1 TABLET DAILY TABLET ORAL
Refills: 0 | Status: ACTIVE | COMMUNITY

## 2021-04-02 RX ORDER — DICYCLOMINE HYDROCHLORIDE 10 MG/1
TAKE 1 CAPSULE BY MOUTH EVERY 6 HOURS AS NEEDED CAPSULE ORAL
Qty: 60 | Refills: 0 | Status: ACTIVE | COMMUNITY

## 2021-04-02 RX ORDER — FAMOTIDINE 40 MG/1
TAKE 1 TABLET BY MOUTH AT BEDTIME TABLET ORAL
Qty: 90 | Refills: 3 | Status: ACTIVE | COMMUNITY
Start: 2020-07-01

## 2021-04-02 RX ORDER — PREGABALIN 75 MG/1
TAKE 1 CAPSULE AT BEDTIME CAPSULE ORAL
Refills: 0 | Status: ON HOLD | COMMUNITY
Start: 2020-02-17

## 2021-04-02 RX ORDER — INSULIN ASPART 100 [IU]/ML
ADM UP TO 80 UNI VIA INSULIN PUMP D INJECTION, SOLUTION INTRAVENOUS; SUBCUTANEOUS
Qty: 20 | Refills: 0 | Status: ON HOLD | COMMUNITY
Start: 2020-02-20

## 2021-04-02 RX ORDER — METHYLPREDNISOLONE 4 MG/1
TABLET ORAL
Qty: 21 | Refills: 0 | Status: ON HOLD | COMMUNITY
Start: 2019-07-22

## 2021-04-02 RX ORDER — AMOXICILLIN 500 MG/1
CAPSULE ORAL
Qty: 15 | Refills: 0 | Status: ON HOLD | COMMUNITY
Start: 2019-07-01

## 2021-04-02 NOTE — HPI-TODAY'S VISIT:
The patient is a very pleasant 60-year-old female who I followed for gastroparesis, peptic ulcer disease and colonoscopies for screening purposes who recently came in with abdominal pain and abnormal LFTs.  Transaminases were low-grade and around the 50 range.  Imaging studies showed dilated bile duct and pancreatic duct but EUS and MRCP were negative.  Patient maintains low-grade transaminase elevations on most recent laboratory testing last month.  Iron studies were normal.  Her last colonoscopy was December 2017.  This was unremarkable.  She had endoscopic ultrasound in March of this year.  This was unremarkable.  The MRI did reveal a 0.5 mm IPMN.  She was recently tried on Reglan for her nausea and epigastric pain symptoms.  Mild  The patient continues to have postprandial abdominal pain.  She brings with me lab tests from last week which reveal a continued abnormal ALT at 34 but her AST is now returned to normal.  She describes the pain once again is epigastric that radiates to her back.  It is postprandial.  She recently has been considered to possibly have fibromyalgia.  She states her blood sugars have been under excellent control with a hemoglobin A1c down to 6.  She rarely takes the metoclopramide.  She takes her Dexilant about 4 times per week.  If she goes less than that she developed severe heartburn.  Even on this she occasionally gets regurgitation and heartburn. Because of persistent mild elevation in LFTs we performed laboratory work-up for further evaluation.  Celiac panel was negative.  Repeat LFTs came back as normal.  Anti-smooth muscle antibody was negative.  Hepatitis B and hepatitis C testing were negative.  ALT was 28.  AST 36.  Alkaline phosphatase 77. The patient states that she continues to have intermittent episodes of nausea.  Her hemoglobin A1c is 6.5.  She also has intermittent episodes of epigastric pain that sometimes goes to the back that is mild.  It is unchanged though from the past.  She brings with her recent laboratory testing performed on February 2 of this year and revealed an ALT of 41 but normal AST and alkaline phosphatase.  Her hemoglobin A1c is at 6.5.  She has noted that at times her blood sugars have gone up into the 300s.  Her thyroid function tests are normal.

## 2021-04-02 NOTE — EXAM-PHYSICAL EXAM
She is alert and oriented to person place and situation in no acute distress.  There is no scleral icterus. Impression 1.  Epigastric pain.  This is likely related to her gastroparesis.  There is been no change in symptomatology.  There is a remote chance that this could be a retained common bile duct stone but endoscopic ultrasound and MRCP have been negative. 2.  Gastroparesis.  Patient uses metoclopramide as needed along with Zofran and Phenergan.  We once again had teaching in regards to neurological complications. 3.  Abnormal ALT.  Likely nonalcoholic steatohepatitis.  Cannot rule out common bile duct stone. 4.  Acid reflux.  Doing well on Dexilant.  She occasionally takes as needed antacids. 5.  IPMN.  Diminutive.  Repeat MRI in a year and a half.

## 2021-05-18 ENCOUNTER — ERX REFILL RESPONSE (OUTPATIENT)
Dept: URBAN - METROPOLITAN AREA CLINIC 113 | Facility: CLINIC | Age: 61
End: 2021-05-18

## 2021-05-18 RX ORDER — DICYCLOMINE HYDROCHLORIDE 10 MG/1
1 CAPSULE CAPSULE ORAL EVERY 6 HOURS
Qty: 60 | Refills: 0

## 2021-06-01 ENCOUNTER — TELEPHONE ENCOUNTER (OUTPATIENT)
Dept: URBAN - METROPOLITAN AREA CLINIC 113 | Facility: CLINIC | Age: 61
End: 2021-06-01

## 2021-06-01 RX ORDER — ASPIRIN 81 MG/1
1 TABLET TABLET ORAL ONCE A DAY
Status: ACTIVE | COMMUNITY

## 2021-06-01 RX ORDER — DESVENLAFAXINE SUCCINATE 100 MG/1
TAKE 2 TABLET DAILY TABLET, EXTENDED RELEASE ORAL
Refills: 0 | Status: ON HOLD | COMMUNITY

## 2021-06-01 RX ORDER — TRAMADOL HYDROCHLORIDE 50 MG/1
1 TABLET AS NEEDED TABLET, FILM COATED ORAL ONCE A DAY
Status: ACTIVE | COMMUNITY

## 2021-06-01 RX ORDER — NYSTATIN 100000 [USP'U]/G
APPLY AA BID CREAM TOPICAL
Qty: 30 | Refills: 0 | Status: ON HOLD | COMMUNITY
Start: 2018-12-27

## 2021-06-01 RX ORDER — FAMOTIDINE 40 MG/1
TAKE 1 TABLET BY MOUTH AT BEDTIME TABLET ORAL
Qty: 90 | Refills: 3 | Status: ACTIVE | COMMUNITY
Start: 2020-07-01

## 2021-06-01 RX ORDER — POLYETHYLENE GLYCOL 3350 17 G/17G
AS DIRECTED POWDER, FOR SOLUTION ORAL
Status: ACTIVE | COMMUNITY

## 2021-06-01 RX ORDER — MODAFINIL 200 MG/1
1 TABLET IN THE MORNING TABLET ORAL ONCE A DAY
Status: ACTIVE | COMMUNITY

## 2021-06-01 RX ORDER — SUCRALFATE 1 G/1
TK 1 T PO AC AND HS ON AN EMPTY STOMACH TABLET ORAL
Qty: 60 | Refills: 0 | Status: ON HOLD | COMMUNITY
Start: 2019-09-06

## 2021-06-01 RX ORDER — LEVOTHYROXINE SODIUM 50 UG/1
TAKE 1 TABLET DAILY TABLET ORAL
Refills: 0 | Status: ACTIVE | COMMUNITY

## 2021-06-01 RX ORDER — DEXLANSOPRAZOLE 60 MG/1
1 CAPSULE CAPSULE, DELAYED RELEASE ORAL ONCE A DAY
Status: ACTIVE | COMMUNITY

## 2021-06-01 RX ORDER — ROSUVASTATIN CALCIUM 40 MG/1
TAKE 1 TABLET DAILY TABLET, FILM COATED ORAL
Refills: 0 | Status: ON HOLD | COMMUNITY

## 2021-06-01 RX ORDER — DILTIAZEM HYDROCHLORIDE 120 MG/1
TK 1 C PO D CAPSULE, COATED, EXTENDED RELEASE ORAL
Qty: 90 | Refills: 0 | Status: ON HOLD | COMMUNITY
Start: 2020-02-04

## 2021-06-01 RX ORDER — AMOXICILLIN 500 MG/1
CAPSULE ORAL
Qty: 15 | Refills: 0 | Status: ON HOLD | COMMUNITY
Start: 2019-07-01

## 2021-06-01 RX ORDER — WHEAT DEXTRIN 3 G/3.5 G
AS DIRECTED POWDER (GRAM) ORAL
Status: ACTIVE | COMMUNITY

## 2021-06-01 RX ORDER — MIRABEGRON 50 MG/1
1 TABLET TABLET, FILM COATED, EXTENDED RELEASE ORAL ONCE A DAY
Status: ACTIVE | COMMUNITY

## 2021-06-01 RX ORDER — INSULIN DEGLUDEC INJECTION 100 U/ML
INJECT 55 UNITS SC QAM INJECTION, SOLUTION SUBCUTANEOUS
Qty: 9 | Refills: 0 | Status: ON HOLD | COMMUNITY
Start: 2019-08-29

## 2021-06-01 RX ORDER — DICYCLOMINE HYDROCHLORIDE 10 MG/1
1 CAPSULE CAPSULE ORAL EVERY 6 HOURS
Qty: 60 | Refills: 0 | Status: ACTIVE | COMMUNITY

## 2021-06-01 RX ORDER — FLUCONAZOLE 150 MG/1
1 TABLET TABLET ORAL
Status: ACTIVE | COMMUNITY

## 2021-06-01 RX ORDER — FLUCONAZOLE 150 MG/1
TABLET ORAL
Qty: 4 | Refills: 0 | Status: ON HOLD | COMMUNITY
Start: 2019-06-25

## 2021-06-01 RX ORDER — CEPHALEXIN 500 MG/1
TK 1 C PO Q 12 H CAPSULE ORAL
Qty: 14 | Refills: 0 | Status: ON HOLD | COMMUNITY
Start: 2019-09-06

## 2021-06-01 RX ORDER — ICOSAPENT ETHYL 1000 MG/1
2 CAPSULES WITH MEALS CAPSULE ORAL TWICE A DAY
Status: ACTIVE | COMMUNITY

## 2021-06-01 RX ORDER — BLOOD SUGAR DIAGNOSTIC
STRIP MISCELLANEOUS
Qty: 400 | Refills: 0 | Status: ON HOLD | COMMUNITY
Start: 2019-05-28

## 2021-06-01 RX ORDER — METOCLOPRAMIDE 10 MG/1
1 TABLET BEFORE MEALS TABLET ORAL TWICE A DAY
Qty: 60 TABLET | Refills: 3 | Status: ON HOLD | COMMUNITY
Start: 2020-08-27

## 2021-06-01 RX ORDER — PROMETHAZINE HYDROCHLORIDE 25 MG/1
TAKE 1 TABLET EVERY 6 HOURS PRN NAUSEA TABLET ORAL
Refills: 3 | Status: ON HOLD | COMMUNITY
Start: 2019-11-27

## 2021-06-01 RX ORDER — METHENAMINE, SODIUM PHOSPHATE, MONOBASIC, MONOHYDRATE, PHENYL SALICYLATE, METHYLENE BLUE, AND HYOSCYAMINE SULFATE 118; 40.8; 36; 10; .12 MG/1; MG/1; MG/1; MG/1; MG/1
1 CAPSULE CAPSULE ORAL
Status: ACTIVE | COMMUNITY

## 2021-06-01 RX ORDER — PROMETHAZINE HYDROCHLORIDE 25 MG/ML
1 ML AS NEEDED INJECTION INTRAMUSCULAR; INTRAVENOUS
Status: ACTIVE | COMMUNITY

## 2021-06-01 RX ORDER — INSULIN ASPART 100 [IU]/ML
ADM UP TO 80 UNI VIA INSULIN PUMP D INJECTION, SOLUTION INTRAVENOUS; SUBCUTANEOUS
Qty: 20 | Refills: 0 | Status: ON HOLD | COMMUNITY
Start: 2020-02-20

## 2021-06-01 RX ORDER — PREGABALIN 75 MG/1
1 CAPSULE CAPSULE ORAL ONCE A DAY
Status: ACTIVE | COMMUNITY

## 2021-06-01 RX ORDER — DEXLANSOPRAZOLE 60 MG/1
1 CAPSULE CAPSULE, DELAYED RELEASE ORAL ONCE A DAY
Qty: 90 CAPSULE | Refills: 1 | Status: ACTIVE | COMMUNITY
Start: 2021-01-25

## 2021-06-01 RX ORDER — MIRABEGRON 50 MG/1
TAKE 1 TABLET DAILY TABLET, FILM COATED, EXTENDED RELEASE ORAL
Refills: 0 | Status: ON HOLD | COMMUNITY

## 2021-06-01 RX ORDER — RIZATRIPTAN BENZOATE 10 MG/1
TAKE 1 TABLET AT ONSET OF HEADACHE. MAY REPEAT EVERY 2 HOURS AS NEEDED. MAXIMUM 3 TABLETS IN 24 HOURS TABLET ORAL
Refills: 0 | Status: ON HOLD | COMMUNITY
Start: 2019-11-27

## 2021-06-01 RX ORDER — BLOOD SUGAR DIAGNOSTIC
TEST BLOOD SUGAR 6 TIMES D STRIP MISCELLANEOUS
Qty: 150 | Refills: 0 | Status: ON HOLD | COMMUNITY
Start: 2020-02-20

## 2021-06-01 RX ORDER — LISINOPRIL AND HYDROCHLOROTHIAZIDE TABLETS 20; 12.5 MG/1; MG/1
TAKE 1 TABLET DAILY TABLET ORAL
Refills: 0 | Status: ACTIVE | COMMUNITY

## 2021-06-01 RX ORDER — ZOLPIDEM TARTRATE 10 MG/1
TK 1 T PO  QHS PRN TABLET, FILM COATED ORAL
Qty: 30 | Refills: 0 | Status: ACTIVE | COMMUNITY
Start: 2020-03-19

## 2021-06-01 RX ORDER — PREGABALIN 75 MG/1
TAKE 1 CAPSULE AT BEDTIME CAPSULE ORAL
Refills: 0 | Status: ON HOLD | COMMUNITY
Start: 2020-02-17

## 2021-06-01 RX ORDER — FENOFIBRATE 145 MG/1
TABLET, FILM COATED ORAL
Qty: 30 | Refills: 0 | Status: ON HOLD | COMMUNITY
Start: 2019-09-17

## 2021-06-01 RX ORDER — AMOXICILLIN AND CLAVULANATE POTASSIUM 875; 125 MG/1; MG/1
TABLET, FILM COATED ORAL
Qty: 14 | Refills: 0 | Status: ON HOLD | COMMUNITY
Start: 2019-11-07

## 2021-06-01 RX ORDER — ONDANSETRON 4 MG/1
1 TABLET ON THE TONGUE AND ALLOW TO DISSOLVE TABLET, ORALLY DISINTEGRATING ORAL EVERY 8 HOURS
Qty: 30 | Refills: 1 | OUTPATIENT
Start: 2021-06-01

## 2021-06-01 RX ORDER — AZITHROMYCIN DIHYDRATE 250 MG/1
TABLET, FILM COATED ORAL
Qty: 6 | Refills: 0 | Status: ON HOLD | COMMUNITY
Start: 2019-12-04

## 2021-06-01 RX ORDER — CYCLOBENZAPRINE HYDROCHLORIDE 10 MG/1
1 TABLET AT BEDTIME AS NEEDED TABLET, FILM COATED ORAL ONCE A DAY
Status: ACTIVE | COMMUNITY

## 2021-06-01 RX ORDER — ONABOTULINUMTOXINA 50 [USP'U]/1
AS DIRECTED INJECTION, POWDER, LYOPHILIZED, FOR SOLUTION INTRAMUSCULAR
Status: ACTIVE | COMMUNITY

## 2021-06-01 RX ORDER — DILTIAZEM HYDROCHLORIDE 120 MG/1
1 CAPSULE CAPSULE, COATED, EXTENDED RELEASE ORAL ONCE A DAY
Status: ACTIVE | COMMUNITY

## 2021-06-01 RX ORDER — INSULIN ASPART 100 [IU]/ML
INJECT SUBCUTANEOUSLY AS DIRECTED INJECTION, SOLUTION INTRAVENOUS; SUBCUTANEOUS
Refills: 0 | Status: ON HOLD | COMMUNITY
Start: 2019-09-19

## 2021-06-01 RX ORDER — CANAGLIFLOZIN 100 MG/1
TAKE 1 TABLET BY MOUTH ONCE DAILY 30 MINS BEFORE BREAKFAST TABLET, FILM COATED ORAL
Refills: 0 | Status: ACTIVE | COMMUNITY
Start: 2020-01-13

## 2021-06-01 RX ORDER — INSULIN GLARGINE AND LIXISENATIDE 100; 33 U/ML; UG/ML
INJECTION, SOLUTION SUBCUTANEOUS
Qty: 3 | Refills: 0 | Status: ON HOLD | COMMUNITY
Start: 2019-05-28

## 2021-06-01 RX ORDER — LEVONORGESTREL/ETHIN.ESTRADIOL 0.15-0.03
1 TABLET IN THE MORNING TABLET ORAL ONCE A DAY
Status: ON HOLD | COMMUNITY

## 2021-06-01 RX ORDER — ALPRAZOLAM 1 MG/1
TAKE 1 TABLET 3 TIMES DAILY AS NEEDED TABLET ORAL
Refills: 0 | Status: ACTIVE | COMMUNITY
Start: 2019-10-01

## 2021-06-01 RX ORDER — DEXLANSOPRAZOLE 60 MG/1
TAKE 1 CAPSULE DAILY EVERY MORNING BEFORE BREAKFAST CAPSULE, DELAYED RELEASE ORAL
Qty: 90 | Refills: 3 | Status: ON HOLD | COMMUNITY
Start: 2020-01-30

## 2021-06-01 RX ORDER — TRAZODONE HYDROCHLORIDE 100 MG/1
TABLET, FILM COATED ORAL
Qty: 90 | Refills: 0 | Status: ON HOLD | COMMUNITY
Start: 2019-03-27

## 2021-06-01 RX ORDER — ONDANSETRON HYDROCHLORIDE 4 MG/1
TABLET, FILM COATED ORAL
Qty: 18 | Refills: 0 | Status: ACTIVE | COMMUNITY
Start: 2019-05-13

## 2021-06-01 RX ORDER — HYDROXYZINE HYDROCHLORIDE 25 MG/1
TAKE 1 TABLET EVERY 8 HOURS PRN ITCHING TABLET, FILM COATED ORAL
Qty: 90 | Refills: 2 | Status: ON HOLD | COMMUNITY
Start: 2019-10-02

## 2021-06-01 RX ORDER — EZETIMIBE 10 MG/1
1 TABLET TABLET ORAL ONCE A DAY
Status: ACTIVE | COMMUNITY

## 2021-06-01 RX ORDER — METHYLPREDNISOLONE 4 MG/1
TABLET ORAL
Qty: 21 | Refills: 0 | Status: ON HOLD | COMMUNITY
Start: 2019-07-22

## 2021-06-01 RX ORDER — NITROFURANTOIN 50 MG/1
CAPSULE ORAL
Qty: 30 | Refills: 0 | Status: ON HOLD | COMMUNITY
Start: 2019-02-04

## 2021-08-01 ENCOUNTER — ERX REFILL RESPONSE (OUTPATIENT)
Dept: URBAN - METROPOLITAN AREA CLINIC 113 | Facility: CLINIC | Age: 61
End: 2021-08-01

## 2021-08-01 RX ORDER — DEXLANSOPRAZOLE 60 MG/1
TAKE 1 CAPSULE BY MOUTH EVERY DAY CAPSULE, DELAYED RELEASE ORAL
Qty: 90 CAPSULE | Refills: 4 | OUTPATIENT

## 2021-08-01 RX ORDER — DEXLANSOPRAZOLE 60 MG/1
1 CAPSULE CAPSULE, DELAYED RELEASE ORAL ONCE A DAY
Qty: 90 CAPSULE | Refills: 1 | OUTPATIENT

## 2021-08-23 LAB
A/G RATIO: 2.1
ALBUMIN: 4.8
ALKALINE PHOSPHATASE: 77
ALT (SGPT): 28
ANTI-SMOOTH MUSCLE AB BY IFA: (no result)
AST (SGOT): 36
BILIRUBIN, TOTAL: 0.4
BUN/CREATININE RATIO: 12
BUN: 14
CALCIUM: 9.8
CARBON DIOXIDE, TOTAL: 22
CHLORIDE: 100
CREATININE: 1.2
EGFR IF AFRICN AM: 56
EGFR IF NONAFRICN AM: 49
GLOBULIN, TOTAL: 2.3
GLUCOSE: 139
HBSAG SCREEN: NEGATIVE
HEP C VIRUS AB: <0.1
POTASSIUM: 4.6
PROTEIN, TOTAL: 7.1
SODIUM: 143
T-TRANSGLUTAMINASE (TTG) IGA: <2
T-TRANSGLUTAMINASE (TTG) IGG: 4

## 2021-09-23 ENCOUNTER — TELEPHONE ENCOUNTER (OUTPATIENT)
Dept: URBAN - METROPOLITAN AREA CLINIC 23 | Facility: CLINIC | Age: 61
End: 2021-09-23

## 2021-09-27 ENCOUNTER — OFFICE VISIT (OUTPATIENT)
Dept: URBAN - METROPOLITAN AREA CLINIC 113 | Facility: CLINIC | Age: 61
End: 2021-09-27
Payer: COMMERCIAL

## 2021-09-27 VITALS
HEART RATE: 104 BPM | BODY MASS INDEX: 31.18 KG/M2 | HEIGHT: 63 IN | TEMPERATURE: 98 F | SYSTOLIC BLOOD PRESSURE: 127 MMHG | WEIGHT: 176 LBS | DIASTOLIC BLOOD PRESSURE: 83 MMHG | RESPIRATION RATE: 18 BRPM

## 2021-09-27 DIAGNOSIS — R10.84 GENERALIZED ABDOMINAL PAIN: ICD-10-CM

## 2021-09-27 DIAGNOSIS — R10.13 EPIGASTRIC ABDOMINAL PAIN: ICD-10-CM

## 2021-09-27 DIAGNOSIS — R93.5 ABNORMAL CT OF THE ABDOMEN: ICD-10-CM

## 2021-09-27 DIAGNOSIS — R11.0 NAUSEA: ICD-10-CM

## 2021-09-27 DIAGNOSIS — K21.9 GASTROESOPHAGEAL REFLUX DISEASE WITHOUT ESOPHAGITIS: ICD-10-CM

## 2021-09-27 PROCEDURE — 99214 OFFICE O/P EST MOD 30 MIN: CPT | Performed by: PHYSICIAN ASSISTANT

## 2021-09-27 RX ORDER — ROSUVASTATIN CALCIUM 40 MG/1
TAKE 1 TABLET DAILY TABLET, FILM COATED ORAL
Refills: 0 | Status: ACTIVE | COMMUNITY

## 2021-09-27 RX ORDER — INSULIN ASPART 100 [IU]/ML
ADM UP TO 80 UNI VIA INSULIN PUMP D INJECTION, SOLUTION INTRAVENOUS; SUBCUTANEOUS
Qty: 20 | Refills: 0 | Status: ACTIVE | COMMUNITY
Start: 2020-02-20

## 2021-09-27 RX ORDER — NYSTATIN 100000 [USP'U]/G
APPLY AA BID CREAM TOPICAL
Qty: 30 | Refills: 0 | Status: DISCONTINUED | COMMUNITY
Start: 2018-12-27

## 2021-09-27 RX ORDER — ONDANSETRON HYDROCHLORIDE 4 MG/1
TABLET, FILM COATED ORAL
Qty: 18 | Refills: 0 | Status: DISCONTINUED | COMMUNITY
Start: 2019-05-13

## 2021-09-27 RX ORDER — DICYCLOMINE HYDROCHLORIDE 10 MG/1
1 CAPSULE CAPSULE ORAL EVERY 6 HOURS
Qty: 60 | Refills: 0 | Status: ACTIVE | COMMUNITY

## 2021-09-27 RX ORDER — LEVONORGESTREL/ETHIN.ESTRADIOL 0.15-0.03
1 TABLET IN THE MORNING TABLET ORAL ONCE A DAY
Status: DISCONTINUED | COMMUNITY

## 2021-09-27 RX ORDER — AMOXICILLIN AND CLAVULANATE POTASSIUM 875; 125 MG/1; MG/1
TABLET, FILM COATED ORAL
Qty: 14 | Refills: 0 | Status: DISCONTINUED | COMMUNITY
Start: 2019-11-07

## 2021-09-27 RX ORDER — PROMETHAZINE HYDROCHLORIDE 25 MG/ML
1 ML AS NEEDED INJECTION INTRAMUSCULAR; INTRAVENOUS
OUTPATIENT

## 2021-09-27 RX ORDER — METOCLOPRAMIDE 10 MG/1
1 TABLET BEFORE MEALS TABLET ORAL TWICE A DAY
Qty: 60 TABLET | Refills: 3 | Status: DISCONTINUED | COMMUNITY
Start: 2020-08-27

## 2021-09-27 RX ORDER — TRAMADOL HYDROCHLORIDE 50 MG/1
1 TABLET AS NEEDED TABLET, FILM COATED ORAL ONCE A DAY
Status: ACTIVE | COMMUNITY

## 2021-09-27 RX ORDER — ONDANSETRON 4 MG/1
1 TABLET ON THE TONGUE AND ALLOW TO DISSOLVE TABLET, ORALLY DISINTEGRATING ORAL EVERY 8 HOURS
OUTPATIENT
Start: 2021-06-01

## 2021-09-27 RX ORDER — ONABOTULINUMTOXINA 50 [USP'U]/1
AS DIRECTED INJECTION, POWDER, LYOPHILIZED, FOR SOLUTION INTRAMUSCULAR
Status: DISCONTINUED | COMMUNITY

## 2021-09-27 RX ORDER — BLOOD SUGAR DIAGNOSTIC
STRIP MISCELLANEOUS
Qty: 400 | Refills: 0 | Status: DISCONTINUED | COMMUNITY
Start: 2019-05-28

## 2021-09-27 RX ORDER — CEPHALEXIN 500 MG/1
TK 1 C PO Q 12 H CAPSULE ORAL
Qty: 14 | Refills: 0 | Status: DISCONTINUED | COMMUNITY
Start: 2019-09-06

## 2021-09-27 RX ORDER — METHENAMINE, SODIUM PHOSPHATE, MONOBASIC, MONOHYDRATE, PHENYL SALICYLATE, METHYLENE BLUE, AND HYOSCYAMINE SULFATE 118; 40.8; 36; 10; .12 MG/1; MG/1; MG/1; MG/1; MG/1
1 CAPSULE CAPSULE ORAL
Status: ACTIVE | COMMUNITY

## 2021-09-27 RX ORDER — TRAZODONE HYDROCHLORIDE 100 MG/1
1 TABLET AT BEDTIME TABLET, FILM COATED ORAL ONCE A DAY
Refills: 0 | Status: DISCONTINUED | COMMUNITY
Start: 2019-03-27

## 2021-09-27 RX ORDER — HYDROXYZINE HYDROCHLORIDE 25 MG/1
TAKE 1 TABLET EVERY 8 HOURS PRN ITCHING TABLET, FILM COATED ORAL
Qty: 90 | Refills: 2 | Status: DISCONTINUED | COMMUNITY
Start: 2019-10-02

## 2021-09-27 RX ORDER — CANAGLIFLOZIN 100 MG/1
TAKE 1 TABLET BY MOUTH ONCE DAILY 30 MINS BEFORE BREAKFAST TABLET, FILM COATED ORAL
Refills: 0 | Status: ACTIVE | COMMUNITY
Start: 2020-01-13

## 2021-09-27 RX ORDER — LEVOTHYROXINE SODIUM 75 UG/1
1 TABLET IN THE MORNING ON AN EMPTY STOMACH TABLET ORAL ONCE A DAY
Refills: 0 | Status: ACTIVE | COMMUNITY

## 2021-09-27 RX ORDER — MODAFINIL 200 MG/1
1 TABLET IN THE MORNING TABLET ORAL ONCE A DAY
Status: DISCONTINUED | COMMUNITY

## 2021-09-27 RX ORDER — METHYLPREDNISOLONE 4 MG/1
TABLET ORAL
Qty: 21 | Refills: 0 | Status: DISCONTINUED | COMMUNITY
Start: 2019-07-22

## 2021-09-27 RX ORDER — FLUCONAZOLE 150 MG/1
1 TABLET TABLET ORAL
Status: DISCONTINUED | COMMUNITY

## 2021-09-27 RX ORDER — BUPROPION HYDROCHLORIDE 300 MG/1
1 TABLET IN THE MORNING TABLET, FILM COATED, EXTENDED RELEASE ORAL ONCE A DAY
Status: ACTIVE | COMMUNITY

## 2021-09-27 RX ORDER — PREGABALIN 75 MG/1
TAKE 1 CAPSULE AT BEDTIME CAPSULE ORAL
Refills: 0 | Status: DISCONTINUED | COMMUNITY
Start: 2020-02-17

## 2021-09-27 RX ORDER — ASPIRIN 81 MG/1
1 TABLET TABLET ORAL ONCE A DAY
Status: ACTIVE | COMMUNITY

## 2021-09-27 RX ORDER — DESVENLAFAXINE SUCCINATE 100 MG/1
TAKE 2 TABLET DAILY TABLET, EXTENDED RELEASE ORAL
Refills: 0 | Status: DISCONTINUED | COMMUNITY

## 2021-09-27 RX ORDER — INSULIN GLARGINE AND LIXISENATIDE 100; 33 U/ML; UG/ML
INJECTION, SOLUTION SUBCUTANEOUS
Qty: 3 | Refills: 0 | Status: DISCONTINUED | COMMUNITY
Start: 2019-05-28

## 2021-09-27 RX ORDER — WHEAT DEXTRIN 3 G/3.5 G
AS DIRECTED POWDER (GRAM) ORAL
Status: ACTIVE | COMMUNITY

## 2021-09-27 RX ORDER — FAMOTIDINE 40 MG/1
TAKE 1 TABLET BY MOUTH AT BEDTIME TABLET ORAL
Qty: 90 | Refills: 3 | Status: DISCONTINUED | COMMUNITY
Start: 2020-07-01

## 2021-09-27 RX ORDER — POLYETHYLENE GLYCOL 3350 17 G/17G
AS DIRECTED POWDER, FOR SOLUTION ORAL
Status: ACTIVE | COMMUNITY

## 2021-09-27 RX ORDER — ZOLPIDEM TARTRATE 10 MG/1
TK 1 T PO  QHS PRN TABLET, FILM COATED ORAL
Qty: 30 | Refills: 0 | Status: DISCONTINUED | COMMUNITY
Start: 2020-03-19

## 2021-09-27 RX ORDER — DEXLANSOPRAZOLE 60 MG/1
TAKE 1 CAPSULE BY MOUTH EVERY DAY CAPSULE, DELAYED RELEASE ORAL
Qty: 90 CAPSULE | Refills: 4

## 2021-09-27 RX ORDER — LISINOPRIL AND HYDROCHLOROTHIAZIDE TABLETS 20; 12.5 MG/1; MG/1
TAKE 1 TABLET DAILY TABLET ORAL
Refills: 0 | Status: ACTIVE | COMMUNITY

## 2021-09-27 RX ORDER — SUCRALFATE 1 G/1
TK 1 T PO AC AND HS ON AN EMPTY STOMACH TABLET ORAL
Qty: 60 | Refills: 0 | Status: DISCONTINUED | COMMUNITY
Start: 2019-09-06

## 2021-09-27 RX ORDER — AZITHROMYCIN DIHYDRATE 250 MG/1
TABLET, FILM COATED ORAL
Qty: 6 | Refills: 0 | Status: DISCONTINUED | COMMUNITY
Start: 2019-12-04

## 2021-09-27 RX ORDER — NITROFURANTOIN 50 MG/1
CAPSULE ORAL
Qty: 30 | Refills: 0 | Status: DISCONTINUED | COMMUNITY
Start: 2019-02-04

## 2021-09-27 RX ORDER — FLUCONAZOLE 150 MG/1
TABLET ORAL
Qty: 4 | Refills: 0 | Status: DISCONTINUED | COMMUNITY
Start: 2019-06-25

## 2021-09-27 RX ORDER — RIZATRIPTAN BENZOATE 10 MG/1
TAKE 1 TABLET AT ONSET OF HEADACHE. MAY REPEAT EVERY 2 HOURS AS NEEDED. MAXIMUM 3 TABLETS IN 24 HOURS TABLET ORAL
Refills: 0 | Status: DISCONTINUED | COMMUNITY
Start: 2019-11-27

## 2021-09-27 RX ORDER — BLOOD SUGAR DIAGNOSTIC
TEST BLOOD SUGAR 6 TIMES D STRIP MISCELLANEOUS
Qty: 150 | Refills: 0 | Status: DISCONTINUED | COMMUNITY
Start: 2020-02-20

## 2021-09-27 RX ORDER — ONDANSETRON 4 MG/1
1 TABLET ON THE TONGUE AND ALLOW TO DISSOLVE TABLET, ORALLY DISINTEGRATING ORAL EVERY 8 HOURS
Qty: 30 | Refills: 1 | Status: ACTIVE | COMMUNITY
Start: 2021-06-01

## 2021-09-27 RX ORDER — INSULIN ASPART 100 [IU]/ML
INJECT SUBCUTANEOUSLY AS DIRECTED INJECTION, SOLUTION INTRAVENOUS; SUBCUTANEOUS
Refills: 0 | Status: ACTIVE | COMMUNITY
Start: 2019-09-19

## 2021-09-27 RX ORDER — CYCLOBENZAPRINE HYDROCHLORIDE 10 MG/1
1 TABLET AT BEDTIME AS NEEDED TABLET, FILM COATED ORAL ONCE A DAY
Status: ACTIVE | COMMUNITY

## 2021-09-27 RX ORDER — DILTIAZEM HYDROCHLORIDE 120 MG/1
TK 1 C PO D CAPSULE, COATED, EXTENDED RELEASE ORAL
Qty: 90 | Refills: 0 | Status: DISCONTINUED | COMMUNITY
Start: 2020-02-04

## 2021-09-27 RX ORDER — EZETIMIBE 10 MG/1
1 TABLET TABLET ORAL ONCE A DAY
Status: ACTIVE | COMMUNITY

## 2021-09-27 RX ORDER — DILTIAZEM HYDROCHLORIDE 120 MG/1
1 CAPSULE CAPSULE, COATED, EXTENDED RELEASE ORAL ONCE A DAY
Status: ACTIVE | COMMUNITY

## 2021-09-27 RX ORDER — AMOXICILLIN 500 MG/1
CAPSULE ORAL
Qty: 15 | Refills: 0 | Status: DISCONTINUED | COMMUNITY
Start: 2019-07-01

## 2021-09-27 RX ORDER — DICYCLOMINE HYDROCHLORIDE 10 MG/1
1 CAPSULE CAPSULE ORAL EVERY 6 HOURS
Qty: 60 | Refills: 0

## 2021-09-27 RX ORDER — ALPRAZOLAM 1 MG/1
TAKE 1 TABLET 3 TIMES DAILY AS NEEDED TABLET ORAL
Refills: 0 | Status: ACTIVE | COMMUNITY
Start: 2019-10-01

## 2021-09-27 RX ORDER — DEXLANSOPRAZOLE 60 MG/1
TAKE 1 CAPSULE BY MOUTH EVERY DAY CAPSULE, DELAYED RELEASE ORAL
Qty: 90 CAPSULE | Refills: 4 | Status: ACTIVE | COMMUNITY

## 2021-09-27 RX ORDER — ICOSAPENT ETHYL 1000 MG/1
2 CAPSULES WITH MEALS CAPSULE ORAL TWICE A DAY
Status: ACTIVE | COMMUNITY

## 2021-09-27 RX ORDER — FENOFIBRATE 145 MG/1
TABLET, FILM COATED ORAL
Qty: 30 | Refills: 0 | Status: DISCONTINUED | COMMUNITY
Start: 2019-09-17

## 2021-09-27 RX ORDER — PROMETHAZINE HYDROCHLORIDE 25 MG/1
TAKE 1 TABLET EVERY 6 HOURS PRN NAUSEA TABLET ORAL
Refills: 3 | Status: DISCONTINUED | COMMUNITY
Start: 2019-11-27

## 2021-09-27 RX ORDER — INSULIN DEGLUDEC INJECTION 100 U/ML
INJECT 55 UNITS SC QAM INJECTION, SOLUTION SUBCUTANEOUS
Qty: 9 | Refills: 0 | Status: DISCONTINUED | COMMUNITY
Start: 2019-08-29

## 2021-09-27 RX ORDER — PREGABALIN 75 MG/1
1 CAPSULE CAPSULE ORAL ONCE A DAY
Status: ACTIVE | COMMUNITY

## 2021-09-27 RX ORDER — MIRABEGRON 50 MG/1
1 TABLET TABLET, FILM COATED, EXTENDED RELEASE ORAL ONCE A DAY
Status: ACTIVE | COMMUNITY

## 2021-09-27 RX ORDER — MIRABEGRON 50 MG/1
TAKE 1 TABLET DAILY TABLET, FILM COATED, EXTENDED RELEASE ORAL
Refills: 0 | Status: ACTIVE | COMMUNITY

## 2021-09-27 RX ORDER — PROMETHAZINE HYDROCHLORIDE 25 MG/ML
1 ML AS NEEDED INJECTION INTRAMUSCULAR; INTRAVENOUS
Status: ACTIVE | COMMUNITY

## 2021-09-27 NOTE — PHYSICAL EXAM GASTROINTESTINAL
Abdomen,  soft, diffuse abdominal tenderness, greater in the epigastric region. Nondistended,  no guarding or rigidity,  no masses palpable,  normal bowel sounds

## 2021-09-27 NOTE — HPI-TODAY'S VISIT:
Ms. Yost is a 61-year-old woman followed by Dr. Sherman in the past for multiple issues including gastroparesis, peptic ulcer disease and screening colonoscopy. He has also followed her for abdominal pain and mild elevations in transaminases. Imaging studies showed dilated bile duct and pancreatic duct, but EUS and MRCP were negative. Her last colonoscopy was performed in December 2017. This was unremarkable. EUS in March 2021 was negative. She has an MRI which revealed a 0.5 mm IPMN.  She was last seen on 04/02/2021 with complaints of postprandial abdominal pain. She had laboratory testing which revealed continued abnormal ALT at 34, but her AST returned to normal. She also reported continued complaints of nausea. Her symptoms were felt to be likely secondary to gastroparesis. She was to continue her regimen consisting of metoclopramide as needed along with Zofran and Phenergan.  She continues to experience generalized abdominal discomfort.  The pain will occasionally radiate to her left upper paraspinal area.  She reports significant amounts of nausea, episodes of diaphoresis and abdominal cramping.  Denies any known precipitating factors.  Denies vomiting, fevers or chills.  No unintentional weight loss.  She had an abdominal ultrasound performed on 9/24/2021 which revealed possible common bile duct filling defect versus artifact; the common bile duct is stable in caliber when compared to prior MRI gallbladder is surgically absent; MRCP could be beneficial if clinically indicated.  She also had labs performed on 9/20/2021 which revealed WBC 11.0, RBC 4.92, hemoglobin/hematocrit 14.7/44.7, MCV 91, platelets 271; hemoglobin A1c of 6.6.  CRP 42.  Since her last office that she has been on multiple antibiotics for dental surgery and UTI.  She denies any diarrhea these times.  In fact, she reports constipation in which she has to strain to have a bowel movement.  She uses MiraLAX & stool softeners to help improve her constipation.  No red blood per rectum or melena.

## 2021-09-28 ENCOUNTER — TELEPHONE ENCOUNTER (OUTPATIENT)
Dept: URBAN - METROPOLITAN AREA CLINIC 113 | Facility: CLINIC | Age: 61
End: 2021-09-28

## 2021-09-28 LAB
ALBUMIN: 4.9
ALKALINE PHOSPHATASE: 72
ALT (SGPT): 35
AST (SGOT): 37
BILIRUBIN, DIRECT: 0.14
BILIRUBIN, TOTAL: 0.4
BUN/CREATININE RATIO: 8
BUN: 10
CARBON DIOXIDE, TOTAL: 23
CHLORIDE: 102
CREATININE: 1.22
EGFR IF AFRICN AM: 54
EGFR IF NONAFRICN AM: 47
GLUCOSE: 111
LIPASE: 37
POTASSIUM: 5.1
PROTEIN, TOTAL: 7.6
SODIUM: 141

## 2021-09-29 ENCOUNTER — ERX REFILL RESPONSE (OUTPATIENT)
Dept: URBAN - METROPOLITAN AREA CLINIC 113 | Facility: CLINIC | Age: 61
End: 2021-09-29

## 2021-09-29 RX ORDER — DICYCLOMINE HYDROCHLORIDE 10 MG/1
TAKE 1 CAPSULE BY MOUTH EVERY 6 HOURS FOR 15 DAYS CAPSULE ORAL
Qty: 60 CAPSULE | Refills: 12 | OUTPATIENT

## 2021-09-29 RX ORDER — DICYCLOMINE HYDROCHLORIDE 10 MG/1
1 CAPSULE CAPSULE ORAL EVERY 6 HOURS
Qty: 60 | Refills: 0 | OUTPATIENT

## 2021-10-13 ENCOUNTER — OFFICE VISIT (OUTPATIENT)
Dept: URBAN - METROPOLITAN AREA CLINIC 113 | Facility: CLINIC | Age: 61
End: 2021-10-13

## 2021-10-22 ENCOUNTER — OFFICE VISIT (OUTPATIENT)
Dept: URBAN - METROPOLITAN AREA CLINIC 107 | Facility: CLINIC | Age: 61
End: 2021-10-22
Payer: COMMERCIAL

## 2021-10-22 VITALS
WEIGHT: 176 LBS | BODY MASS INDEX: 31.18 KG/M2 | HEART RATE: 96 BPM | DIASTOLIC BLOOD PRESSURE: 84 MMHG | TEMPERATURE: 98.3 F | RESPIRATION RATE: 18 BRPM | SYSTOLIC BLOOD PRESSURE: 132 MMHG | HEIGHT: 63 IN

## 2021-10-22 DIAGNOSIS — K83.4 SPHINCTER OF ODDI DYSFUNCTION: ICD-10-CM

## 2021-10-22 DIAGNOSIS — R74.8 ABNORMAL LIVER ENZYMES: ICD-10-CM

## 2021-10-22 DIAGNOSIS — R10.13 EPIGASTRIC PAIN: ICD-10-CM

## 2021-10-22 DIAGNOSIS — K21.9 GASTROESOPHAGEAL REFLUX DISEASE WITHOUT ESOPHAGITIS: ICD-10-CM

## 2021-10-22 PROCEDURE — 99214 OFFICE O/P EST MOD 30 MIN: CPT | Performed by: INTERNAL MEDICINE

## 2021-10-22 RX ORDER — ROSUVASTATIN CALCIUM 40 MG/1
TAKE 1 TABLET DAILY TABLET, FILM COATED ORAL
Refills: 0 | Status: ACTIVE | COMMUNITY

## 2021-10-22 RX ORDER — TRAMADOL HYDROCHLORIDE 50 MG/1
1 TABLET AS NEEDED TABLET, FILM COATED ORAL ONCE A DAY
Status: ACTIVE | COMMUNITY

## 2021-10-22 RX ORDER — TIZANIDINE 4 MG/1
1 TABLET AS NEEDED TABLET ORAL THREE TIMES A DAY
Status: ACTIVE | COMMUNITY

## 2021-10-22 RX ORDER — ONDANSETRON 4 MG/1
1 TABLET ON THE TONGUE AND ALLOW TO DISSOLVE TABLET, ORALLY DISINTEGRATING ORAL EVERY 8 HOURS
Status: ACTIVE | COMMUNITY
Start: 2021-06-01

## 2021-10-22 RX ORDER — BUPROPION HYDROCHLORIDE 300 MG/1
1 TABLET IN THE MORNING TABLET, FILM COATED, EXTENDED RELEASE ORAL ONCE A DAY
Status: ACTIVE | COMMUNITY

## 2021-10-22 RX ORDER — DULOXETINE 60 MG/1
1 CAPSULE CAPSULE, DELAYED RELEASE PELLETS ORAL ONCE A DAY
Status: ACTIVE | COMMUNITY

## 2021-10-22 RX ORDER — BUPROPION HYDROCHLORIDE 300 MG/1
1 TABLET IN THE MORNING TABLET, EXTENDED RELEASE ORAL ONCE A DAY
Status: ACTIVE | COMMUNITY

## 2021-10-22 RX ORDER — AMOXICILLIN AND CLAVULANATE POTASSIUM 562.5; 437.5; 62.5 MG/1; MG/1; MG/1
1 TABLET TABLET, MULTILAYER, EXTENDED RELEASE ORAL
Status: ACTIVE | COMMUNITY

## 2021-10-22 RX ORDER — INSULIN ASPART 100 [IU]/ML
ADM UP TO 80 UNI VIA INSULIN PUMP D INJECTION, SOLUTION INTRAVENOUS; SUBCUTANEOUS
Qty: 20 | Refills: 0 | Status: ACTIVE | COMMUNITY
Start: 2020-02-20

## 2021-10-22 RX ORDER — WHEAT DEXTRIN 3 G/3.5 G
AS DIRECTED POWDER (GRAM) ORAL
Status: ACTIVE | COMMUNITY

## 2021-10-22 RX ORDER — CANAGLIFLOZIN 100 MG/1
TAKE 1 TABLET BY MOUTH ONCE DAILY 30 MINS BEFORE BREAKFAST TABLET, FILM COATED ORAL
Refills: 0 | Status: ACTIVE | COMMUNITY
Start: 2020-01-13

## 2021-10-22 RX ORDER — DEXLANSOPRAZOLE 60 MG/1
TAKE 1 CAPSULE BY MOUTH EVERY DAY CAPSULE, DELAYED RELEASE ORAL
Qty: 90 CAPSULE | Refills: 4 | Status: ACTIVE | COMMUNITY

## 2021-10-22 RX ORDER — POLYETHYLENE GLYCOL 3350 17 G/17G
AS DIRECTED POWDER, FOR SOLUTION ORAL
Status: ACTIVE | COMMUNITY

## 2021-10-22 RX ORDER — INSULIN ASPART 100 [IU]/ML
INJECT SUBCUTANEOUSLY AS DIRECTED INJECTION, SOLUTION INTRAVENOUS; SUBCUTANEOUS
Refills: 0 | Status: ON HOLD | COMMUNITY
Start: 2019-09-19

## 2021-10-22 RX ORDER — METHENAMINE, SODIUM PHOSPHATE, MONOBASIC, MONOHYDRATE, PHENYL SALICYLATE, METHYLENE BLUE, AND HYOSCYAMINE SULFATE 118; 40.8; 36; 10; .12 MG/1; MG/1; MG/1; MG/1; MG/1
1 CAPSULE CAPSULE ORAL
Status: ACTIVE | COMMUNITY

## 2021-10-22 RX ORDER — EZETIMIBE 10 MG/1
1 TABLET TABLET ORAL ONCE A DAY
Status: ACTIVE | COMMUNITY

## 2021-10-22 RX ORDER — MIRABEGRON 50 MG/1
1 TABLET TABLET, FILM COATED, EXTENDED RELEASE ORAL ONCE A DAY
Status: ON HOLD | COMMUNITY

## 2021-10-22 RX ORDER — MIRABEGRON 50 MG/1
TAKE 1 TABLET DAILY TABLET, FILM COATED, EXTENDED RELEASE ORAL
Refills: 0 | Status: ON HOLD | COMMUNITY

## 2021-10-22 RX ORDER — PREGABALIN 75 MG/1
1 CAPSULE CAPSULE ORAL ONCE A DAY
Status: ACTIVE | COMMUNITY

## 2021-10-22 RX ORDER — CYCLOBENZAPRINE HYDROCHLORIDE 10 MG/1
1 TABLET AT BEDTIME AS NEEDED TABLET, FILM COATED ORAL ONCE A DAY
Status: ON HOLD | COMMUNITY

## 2021-10-22 RX ORDER — ASPIRIN 81 MG/1
1 TABLET TABLET ORAL ONCE A DAY
Status: ACTIVE | COMMUNITY

## 2021-10-22 RX ORDER — LISINOPRIL AND HYDROCHLOROTHIAZIDE TABLETS 20; 12.5 MG/1; MG/1
TAKE 1 TABLET DAILY TABLET ORAL
Refills: 0 | Status: ACTIVE | COMMUNITY

## 2021-10-22 RX ORDER — DEXLANSOPRAZOLE 60 MG/1
TAKE 1 CAPSULE BY MOUTH EVERY DAY CAPSULE, DELAYED RELEASE ORAL
OUTPATIENT

## 2021-10-22 RX ORDER — ICOSAPENT ETHYL 1000 MG/1
2 CAPSULES WITH MEALS CAPSULE ORAL TWICE A DAY
Status: ACTIVE | COMMUNITY

## 2021-10-22 RX ORDER — ALPRAZOLAM 1 MG/1
TAKE 1 TABLET 3 TIMES DAILY AS NEEDED TABLET ORAL
Refills: 0 | Status: ACTIVE | COMMUNITY
Start: 2019-10-01

## 2021-10-22 RX ORDER — DILTIAZEM HYDROCHLORIDE 120 MG/1
1 CAPSULE CAPSULE, COATED, EXTENDED RELEASE ORAL ONCE A DAY
Status: ACTIVE | COMMUNITY

## 2021-10-22 RX ORDER — PROMETHAZINE HYDROCHLORIDE 25 MG/ML
1 ML AS NEEDED INJECTION INTRAMUSCULAR; INTRAVENOUS
Status: ACTIVE | COMMUNITY

## 2021-10-22 RX ORDER — LEVOTHYROXINE SODIUM 75 UG/1
1 TABLET IN THE MORNING ON AN EMPTY STOMACH TABLET ORAL ONCE A DAY
Refills: 0 | Status: ACTIVE | COMMUNITY

## 2021-10-22 RX ORDER — DICYCLOMINE HYDROCHLORIDE 10 MG/1
TAKE 1 CAPSULE BY MOUTH EVERY 6 HOURS FOR 15 DAYS CAPSULE ORAL
Qty: 60 CAPSULE | Refills: 12 | Status: ACTIVE | COMMUNITY

## 2021-10-22 NOTE — HPI-TODAY'S VISIT:
Ms. Yost is a 61-year-old woman followed by Dr. Sherman in the past for multiple issues including gastroparesis, peptic ulcer disease and screening colonoscopy. He has also followed her for abdominal pain and mild elevations in transaminases. Imaging studies showed dilated bile duct and pancreatic duct, but EUS and MRCP were negative. Her last colonoscopy was performed in December 2017. This was unremarkable. EUS in March 2021 was negative. She has an MRI which revealed a 0.5 mm IPMN.  She was seen on 04/02/2021 with complaints of postprandial abdominal pain. She had laboratory testing which revealed continued abnormal ALT at 34, but her AST returned to normal. She also reported continued complaints of nausea. Her symptoms were felt to be likely secondary to gastroparesis. She was to continue her regimen consisting of metoclopramide as needed along with Zofran and Phenergan.  She continues to experience generalized abdominal discomfort.  The pain will occasionally radiate to her left upper paraspinal area.  She reports significant amounts of nausea, episodes of diaphoresis and abdominal cramping.  Denies any known precipitating factors.  Denies vomiting, fevers or chills.  No unintentional weight loss.  She had an abdominal ultrasound performed on 9/24/2021 which revealed possible common bile duct filling defect versus artifact; the common bile duct is stable in caliber when compared to prior MRI gallbladder is surgically absent; MRCP could be beneficial if clinically indicated.  She also had labs performed on 9/20/2021 which revealed WBC 11.0, RBC 4.92, hemoglobin/hematocrit 14.7/44.7, MCV 91, platelets 271; hemoglobin A1c of 6.6.  CRP 42.  Since her last office that she has been on multiple antibiotics for dental surgery and UTI.  She denies any diarrhea these times.  In fact, she reports constipation in which she has to strain to have a bowel movement.  She uses MiraLAX & stool softeners to help improve her constipation.  No red blood per rectum or melena.  She was last seen 9/27/2021 for continued abdominal pain and nausea. Symptoms were suspected to be multifactorial from gastroparesis and poor glycemic control. An MRCP was planned due to questionable filling defect of the common bile duct seen on MRI 9/2020. MRCP 10/14/2021 revealed mild hepatic steatosis and no biliary obstruction, filling defect or stricture. Common bile duct was diameter of 1 cm which is likely normal s/p cholecystectomy. An ERCP was considered for future evaluation. BMP, hepatic function panel and lipase were also ordered to rule out hepatic or pancreatic etiologies. Labs 9/27/2021: Lipase WNL, glucose 111, creatinine 1.22, GFR 47, BUN/Creatinine ratio, Albumin 4.9, ALT 35. She was to continue using dicyclomine as needed for pain and continue Dexilant for acid reflux symptoms.   Today she still complains of epigastric pain that worsens after eating and radiates to her back. The pain is sharp and intermittent. She admits to wakes her from sleep at times. She states her dental infection has come back and she is currently on Augmentin. She had labs performed at with PCP on 9/22/2021: WBC 11.0, CRP 42. She was sent to a rheumatologist, Dr. Ortiz, due to elevated CRP and intermittent lower extremity. Polymyalgia rheumatica was not suspected at this time. She also admits to breakthrough acid reflux symptoms despite being on Dexilant once daily. She uses Gaviscon tablets for breakthrough symptoms. She has regular bowel movements  with occasional lower abdominal cramping for which she takes dicyclomine. She has not tried using dicyclomine for her epigastric pain. No bleeding per rectum or black tarry stools. However she does admit to greasy stools every now and then. No yellowing of eyes or skin.

## 2021-12-30 ENCOUNTER — ERX REFILL RESPONSE (OUTPATIENT)
Dept: URBAN - METROPOLITAN AREA CLINIC 113 | Facility: CLINIC | Age: 61
End: 2021-12-30

## 2021-12-30 RX ORDER — ONDANSETRON 4 MG/1
DISSOLVE 1 TABLET ON THE TONGUE EVERY 8 HOURS AS NEEDED FOR NAUSEA AND VOMITING TABLET, ORALLY DISINTEGRATING ORAL
Qty: 30 TABLET | Refills: 1 | OUTPATIENT

## 2021-12-30 RX ORDER — ONDANSETRON 4 MG/1
1 TABLET ON THE TONGUE AND ALLOW TO DISSOLVE TABLET, ORALLY DISINTEGRATING ORAL EVERY 8 HOURS
Qty: 30 | Refills: 1 | OUTPATIENT

## 2022-02-02 ENCOUNTER — OFFICE VISIT (OUTPATIENT)
Dept: URBAN - METROPOLITAN AREA CLINIC 107 | Facility: CLINIC | Age: 62
End: 2022-02-02

## 2022-04-08 ENCOUNTER — OFFICE VISIT (OUTPATIENT)
Dept: URBAN - METROPOLITAN AREA CLINIC 113 | Facility: CLINIC | Age: 62
End: 2022-04-08

## 2022-06-22 ENCOUNTER — CLAIMS CREATED FROM THE CLAIM WINDOW (OUTPATIENT)
Dept: URBAN - METROPOLITAN AREA CLINIC 113 | Facility: CLINIC | Age: 62
End: 2022-06-22
Payer: COMMERCIAL

## 2022-06-22 VITALS
HEIGHT: 63 IN | RESPIRATION RATE: 20 BRPM | DIASTOLIC BLOOD PRESSURE: 81 MMHG | WEIGHT: 174 LBS | BODY MASS INDEX: 30.83 KG/M2 | TEMPERATURE: 97.1 F | SYSTOLIC BLOOD PRESSURE: 125 MMHG | HEART RATE: 98 BPM

## 2022-06-22 DIAGNOSIS — K21.9 GASTROESOPHAGEAL REFLUX DISEASE WITHOUT ESOPHAGITIS: ICD-10-CM

## 2022-06-22 DIAGNOSIS — K83.8 DILATED BILE DUCT: ICD-10-CM

## 2022-06-22 DIAGNOSIS — R10.13 EPIGASTRIC PAIN: ICD-10-CM

## 2022-06-22 DIAGNOSIS — D49.0 IPMN (INTRADUCTAL PAPILLARY MUCINOUS NEOPLASM): ICD-10-CM

## 2022-06-22 DIAGNOSIS — R74.8 ABNORMAL LIVER ENZYMES: ICD-10-CM

## 2022-06-22 DIAGNOSIS — K83.4 SPHINCTER OF ODDI DYSFUNCTION: ICD-10-CM

## 2022-06-22 PROCEDURE — 99214 OFFICE O/P EST MOD 30 MIN: CPT | Performed by: INTERNAL MEDICINE

## 2022-06-22 RX ORDER — AMOXICILLIN AND CLAVULANATE POTASSIUM 562.5; 437.5; 62.5 MG/1; MG/1; MG/1
1 TABLET TABLET, MULTILAYER, EXTENDED RELEASE ORAL
Status: ON HOLD | COMMUNITY

## 2022-06-22 RX ORDER — DILTIAZEM HYDROCHLORIDE 120 MG/1
1 CAPSULE CAPSULE, COATED, EXTENDED RELEASE ORAL ONCE A DAY
Status: ACTIVE | COMMUNITY

## 2022-06-22 RX ORDER — BUPROPION HYDROCHLORIDE 300 MG/1
1 TABLET IN THE MORNING TABLET, EXTENDED RELEASE ORAL ONCE A DAY
Status: ACTIVE | COMMUNITY

## 2022-06-22 RX ORDER — EZETIMIBE 10 MG/1
1 TABLET TABLET ORAL ONCE A DAY
Status: ACTIVE | COMMUNITY

## 2022-06-22 RX ORDER — INSULIN ASPART 100 [IU]/ML
INJECT SUBCUTANEOUSLY AS DIRECTED INJECTION, SOLUTION INTRAVENOUS; SUBCUTANEOUS
Refills: 0 | Status: ON HOLD | COMMUNITY
Start: 2019-09-19

## 2022-06-22 RX ORDER — CYCLOBENZAPRINE HYDROCHLORIDE 10 MG/1
1 TABLET AT BEDTIME AS NEEDED TABLET, FILM COATED ORAL ONCE A DAY
Status: ON HOLD | COMMUNITY

## 2022-06-22 RX ORDER — CANAGLIFLOZIN 100 MG/1
TAKE 1 TABLET BY MOUTH ONCE DAILY 30 MINS BEFORE BREAKFAST TABLET, FILM COATED ORAL
Refills: 0 | Status: ACTIVE | COMMUNITY
Start: 2020-01-13

## 2022-06-22 RX ORDER — LEVOTHYROXINE SODIUM 75 UG/1
1 TABLET IN THE MORNING ON AN EMPTY STOMACH TABLET ORAL ONCE A DAY
Refills: 0 | Status: ACTIVE | COMMUNITY

## 2022-06-22 RX ORDER — MIRABEGRON 50 MG/1
1 TABLET TABLET, FILM COATED, EXTENDED RELEASE ORAL ONCE A DAY
Status: ON HOLD | COMMUNITY

## 2022-06-22 RX ORDER — WHEAT DEXTRIN 3 G/3.5 G
AS DIRECTED POWDER (GRAM) ORAL
Status: ACTIVE | COMMUNITY

## 2022-06-22 RX ORDER — ICOSAPENT ETHYL 1000 MG/1
2 CAPSULES WITH MEALS CAPSULE ORAL TWICE A DAY
Status: ACTIVE | COMMUNITY

## 2022-06-22 RX ORDER — DEXLANSOPRAZOLE 60 MG/1
TAKE 1 CAPSULE BY MOUTH EVERY DAY CAPSULE, DELAYED RELEASE ORAL
Status: ACTIVE | COMMUNITY

## 2022-06-22 RX ORDER — BUPROPION HYDROCHLORIDE 300 MG/1
1 TABLET IN THE MORNING TABLET, FILM COATED, EXTENDED RELEASE ORAL ONCE A DAY
Status: ACTIVE | COMMUNITY

## 2022-06-22 RX ORDER — PROMETHAZINE HYDROCHLORIDE 25 MG/ML
1 ML AS NEEDED INJECTION INTRAMUSCULAR; INTRAVENOUS
Status: ACTIVE | COMMUNITY

## 2022-06-22 RX ORDER — POLYETHYLENE GLYCOL 3350 17 G/17G
AS DIRECTED POWDER, FOR SOLUTION ORAL
Status: ACTIVE | COMMUNITY

## 2022-06-22 RX ORDER — ASPIRIN 81 MG/1
1 TABLET TABLET ORAL ONCE A DAY
Status: ACTIVE | COMMUNITY

## 2022-06-22 RX ORDER — ONDANSETRON 4 MG/1
DISSOLVE 1 TABLET ON THE TONGUE EVERY 8 HOURS AS NEEDED FOR NAUSEA AND VOMITING TABLET, ORALLY DISINTEGRATING ORAL
Qty: 30 TABLET | Refills: 1 | Status: ACTIVE | COMMUNITY

## 2022-06-22 RX ORDER — METHENAMINE, SODIUM PHOSPHATE, MONOBASIC, MONOHYDRATE, PHENYL SALICYLATE, METHYLENE BLUE, AND HYOSCYAMINE SULFATE 118; 40.8; 36; 10; .12 MG/1; MG/1; MG/1; MG/1; MG/1
1 CAPSULE CAPSULE ORAL
Status: ACTIVE | COMMUNITY

## 2022-06-22 RX ORDER — LISINOPRIL AND HYDROCHLOROTHIAZIDE TABLETS 20; 12.5 MG/1; MG/1
TAKE 1 TABLET DAILY TABLET ORAL
Refills: 0 | Status: ACTIVE | COMMUNITY

## 2022-06-22 RX ORDER — TRAMADOL HYDROCHLORIDE 50 MG/1
1 TABLET AS NEEDED TABLET, FILM COATED ORAL ONCE A DAY
Status: ACTIVE | COMMUNITY

## 2022-06-22 RX ORDER — PREGABALIN 75 MG/1
1 CAPSULE CAPSULE ORAL ONCE A DAY
Status: ACTIVE | COMMUNITY

## 2022-06-22 RX ORDER — ONDANSETRON 4 MG/1
DISSOLVE 1 TABLET ON THE TONGUE EVERY 8 HOURS AS NEEDED FOR NAUSEA AND VOMITING TABLET, ORALLY DISINTEGRATING ORAL
Qty: 30 TABLET | Refills: 1

## 2022-06-22 RX ORDER — ALPRAZOLAM 1 MG/1
TAKE 1 TABLET 3 TIMES DAILY AS NEEDED TABLET ORAL
Refills: 0 | Status: ACTIVE | COMMUNITY
Start: 2019-10-01

## 2022-06-22 RX ORDER — MIRABEGRON 50 MG/1
TAKE 1 TABLET DAILY TABLET, FILM COATED, EXTENDED RELEASE ORAL
Refills: 0 | Status: ON HOLD | COMMUNITY

## 2022-06-22 RX ORDER — DULOXETINE 60 MG/1
1 CAPSULE CAPSULE, DELAYED RELEASE PELLETS ORAL ONCE A DAY
Status: ACTIVE | COMMUNITY

## 2022-06-22 RX ORDER — ROSUVASTATIN CALCIUM 40 MG/1
TAKE 1 TABLET DAILY TABLET, FILM COATED ORAL
Refills: 0 | Status: ACTIVE | COMMUNITY

## 2022-06-22 RX ORDER — DICYCLOMINE HYDROCHLORIDE 10 MG/1
TAKE 1 CAPSULE BY MOUTH EVERY 6 HOURS FOR 15 DAYS CAPSULE ORAL
Qty: 60 CAPSULE | Refills: 12 | Status: ACTIVE | COMMUNITY

## 2022-06-22 RX ORDER — INSULIN ASPART 100 [IU]/ML
ADM UP TO 80 UNI VIA INSULIN PUMP D INJECTION, SOLUTION INTRAVENOUS; SUBCUTANEOUS
Qty: 20 | Refills: 0 | Status: ACTIVE | COMMUNITY
Start: 2020-02-20

## 2022-06-22 RX ORDER — TIZANIDINE 4 MG/1
1 TABLET AS NEEDED TABLET ORAL THREE TIMES A DAY
Status: ACTIVE | COMMUNITY

## 2022-06-22 NOTE — HPI-TODAY'S VISIT:
Ms. Yost is a 61-year-old woman followed by Dr. Sherman in the past for multiple issues including gastroparesis, peptic ulcer disease and screening colonoscopy. He has also followed her for abdominal pain and mild elevations in transaminases. Imaging studies showed dilated bile duct and pancreatic duct, but EUS and MRCP were negative. Her last colonoscopy was performed in December 2017. This was unremarkable. EUS in March 2021 was negative. She has an MRI which revealed a 0.5 mm IPMN.  She was seen on 04/02/2021 with complaints of postprandial abdominal pain. She had laboratory testing which revealed continued abnormal ALT at 34, but her AST returned to normal. She also reported continued complaints of nausea. Her symptoms were felt to be likely secondary to gastroparesis. She was to continue her regimen consisting of metoclopramide as needed along with Zofran and Phenergan.  She continues to experience generalized abdominal discomfort.  The pain will occasionally radiate to her left upper paraspinal area.  She reports significant amounts of nausea, episodes of diaphoresis and abdominal cramping.  Denies any known precipitating factors.  Denies vomiting, fevers or chills.  No unintentional weight loss.  She had an abdominal ultrasound performed on 9/24/2021 which revealed possible common bile duct filling defect versus artifact; the common bile duct is stable in caliber when compared to prior MRI gallbladder is surgically absent; MRCP could be beneficial if clinically indicated.  She also had labs performed on 9/20/2021 which revealed WBC 11.0, RBC 4.92, hemoglobin/hematocrit 14.7/44.7, MCV 91, platelets 271; hemoglobin A1c of 6.6.  CRP 42.  Since her last office that she has been on multiple antibiotics for dental surgery and UTI.  She denies any diarrhea these times.  In fact, she reports constipation in which she has to strain to have a bowel movement.  She uses MiraLAX & stool softeners to help improve her constipation.  No red blood per rectum or melena.  She was last seen 9/27/2021 for continued abdominal pain and nausea. Symptoms were suspected to be multifactorial from gastroparesis and poor glycemic control. An MRCP was planned due to questionable filling defect of the common bile duct seen on MRI 9/2020. MRCP 10/14/2021 revealed mild hepatic steatosis and no biliary obstruction, filling defect or stricture. Common bile duct was diameter of 1 cm which is likely normal s/p cholecystectomy. An ERCP was considered for future evaluation. BMP, hepatic function panel and lipase were also ordered to rule out hepatic or pancreatic etiologies. Labs 9/27/2021: Lipase WNL, glucose 111, creatinine 1.22, GFR 47, BUN/Creatinine ratio, Albumin 4.9, ALT 35. She was to continue using dicyclomine as needed for pain and continue Dexilant for acid reflux symptoms.   Today she still complains of epigastric pain that worsens after eating and radiates to her back. The pain is sharp and intermittent. She admits to wakes her from sleep at times. She states her dental infection has come back and she is currently on Augmentin. She had labs performed at with PCP on 9/22/2021: WBC 11.0, CRP 42. She was sent to a rheumatologist, Dr. Ortiz, due to elevated CRP and intermittent lower extremity. Polymyalgia rheumatica was not suspected at this time. She also admits to breakthrough acid reflux symptoms despite being on Dexilant once daily. She uses Gaviscon tablets for breakthrough symptoms. She has regular bowel movements  with occasional lower abdominal cramping for which she takes dicyclomine. She has not tried using dicyclomine for her epigastric pain. No bleeding per rectum or black tarry stools. However she does admit to greasy stools every now and then. No yellowing of eyes or skin. Interval history.  Laboratory testing performed in April of this year revealed a glucose of 136 BUN 15 creatinine 1.4.  LFTs were normal with an AST of 33 ALT of 24.  This was an improvement from the past.  Total bilirubin and alkaline phosphatase were both normal.  Hemoglobin A1c was down to 6.9.  C-reactive protein was 1.  Sed rate 13. The patient states over the last few weeks she has had recurrence of fairly significant nausea and epigastric pain but this is also coincided with a marked decline in her blood sugar control.  She states her blood sugars are yoyoing between 55 and 400.  She is having difficulty in adjusting her pump to take care of this.  She does take Dexilant.  She has constipation though intermixed occasionally with soft stools that are difficult to expel.  She is on MiraLAX but not on fiber.  She thinks that the blood sugar control has developed because of significant stress she is under and taking care of her father who is on hospice care at home.

## 2023-03-10 ENCOUNTER — LAB OUTSIDE AN ENCOUNTER (OUTPATIENT)
Dept: URBAN - METROPOLITAN AREA CLINIC 107 | Facility: CLINIC | Age: 63
End: 2023-03-10

## 2023-03-10 ENCOUNTER — OFFICE VISIT (OUTPATIENT)
Dept: URBAN - METROPOLITAN AREA CLINIC 107 | Facility: CLINIC | Age: 63
End: 2023-03-10
Payer: COMMERCIAL

## 2023-03-10 VITALS
TEMPERATURE: 97.1 F | DIASTOLIC BLOOD PRESSURE: 76 MMHG | BODY MASS INDEX: 30.65 KG/M2 | HEART RATE: 98 BPM | HEIGHT: 63 IN | SYSTOLIC BLOOD PRESSURE: 112 MMHG | WEIGHT: 173 LBS

## 2023-03-10 DIAGNOSIS — K83.4 SPHINCTER OF ODDI DYSFUNCTION: ICD-10-CM

## 2023-03-10 DIAGNOSIS — E11.43 TYPE 2 DIABETES MELLITUS WITH DIABETIC AUTONOMIC (POLY)NEUROPATHY: ICD-10-CM

## 2023-03-10 DIAGNOSIS — K21.9 GASTROESOPHAGEAL REFLUX DISEASE WITHOUT ESOPHAGITIS: ICD-10-CM

## 2023-03-10 DIAGNOSIS — R10.13 EPIGASTRIC PAIN: ICD-10-CM

## 2023-03-10 DIAGNOSIS — R11.0 NAUSEA: ICD-10-CM

## 2023-03-10 DIAGNOSIS — R74.8 ABNORMAL LIVER ENZYMES: ICD-10-CM

## 2023-03-10 DIAGNOSIS — D49.0 IPMN (INTRADUCTAL PAPILLARY MUCINOUS NEOPLASM): ICD-10-CM

## 2023-03-10 DIAGNOSIS — K83.8 DILATED BILE DUCT: ICD-10-CM

## 2023-03-10 PROBLEM — 79922009: Status: ACTIVE | Noted: 2021-10-22

## 2023-03-10 PROBLEM — 166643006: Status: ACTIVE | Noted: 2021-10-22

## 2023-03-10 PROBLEM — 266435005: Status: ACTIVE | Noted: 2020-11-30

## 2023-03-10 PROBLEM — 713704004: Status: ACTIVE | Noted: 2020-08-27

## 2023-03-10 PROBLEM — 430887001: Status: ACTIVE | Noted: 2021-10-22

## 2023-03-10 PROCEDURE — 99214 OFFICE O/P EST MOD 30 MIN: CPT | Performed by: INTERNAL MEDICINE

## 2023-03-10 RX ORDER — ASPIRIN 81 MG/1
1 TABLET TABLET ORAL ONCE A DAY
Status: ACTIVE | COMMUNITY

## 2023-03-10 RX ORDER — DULOXETINE 60 MG/1
1 CAPSULE CAPSULE, DELAYED RELEASE PELLETS ORAL ONCE A DAY
Status: ACTIVE | COMMUNITY

## 2023-03-10 RX ORDER — INSULIN ASPART 100 [IU]/ML
ADM UP TO 80 UNI VIA INSULIN PUMP D INJECTION, SOLUTION INTRAVENOUS; SUBCUTANEOUS
Qty: 20 | Refills: 0 | Status: ACTIVE | COMMUNITY
Start: 2020-02-20

## 2023-03-10 RX ORDER — ONDANSETRON 4 MG/1
DISSOLVE 1 TABLET ON THE TONGUE EVERY 8 HOURS AS NEEDED FOR NAUSEA AND VOMITING TABLET, ORALLY DISINTEGRATING ORAL
Qty: 30 TABLET | Refills: 1 | Status: ACTIVE | COMMUNITY

## 2023-03-10 RX ORDER — POLYETHYLENE GLYCOL 3350 17 G/17G
AS DIRECTED POWDER, FOR SOLUTION ORAL
Status: ACTIVE | COMMUNITY

## 2023-03-10 RX ORDER — WHEAT DEXTRIN 3 G/3.5 G
AS DIRECTED POWDER (GRAM) ORAL
Status: ACTIVE | COMMUNITY

## 2023-03-10 RX ORDER — LEVOTHYROXINE SODIUM 75 UG/1
1 TABLET IN THE MORNING ON AN EMPTY STOMACH TABLET ORAL ONCE A DAY
Refills: 0 | Status: ACTIVE | COMMUNITY

## 2023-03-10 RX ORDER — ROSUVASTATIN CALCIUM 40 MG/1
TAKE 1 TABLET DAILY TABLET, FILM COATED ORAL
Refills: 0 | Status: ACTIVE | COMMUNITY

## 2023-03-10 RX ORDER — EZETIMIBE 10 MG/1
1 TABLET TABLET ORAL ONCE A DAY
Status: ACTIVE | COMMUNITY

## 2023-03-10 RX ORDER — CANAGLIFLOZIN 100 MG/1
TAKE 1 TABLET BY MOUTH ONCE DAILY 30 MINS BEFORE BREAKFAST TABLET, FILM COATED ORAL
Refills: 0 | Status: ACTIVE | COMMUNITY
Start: 2020-01-13

## 2023-03-10 RX ORDER — DEXLANSOPRAZOLE 60 MG/1
TAKE 1 CAPSULE BY MOUTH EVERY DAY CAPSULE, DELAYED RELEASE ORAL
Status: ACTIVE | COMMUNITY

## 2023-03-10 RX ORDER — PREGABALIN 75 MG/1
1 CAPSULE CAPSULE ORAL ONCE A DAY
Status: ACTIVE | COMMUNITY

## 2023-03-10 RX ORDER — ALPRAZOLAM 1 MG/1
TAKE 1 TABLET 3 TIMES DAILY AS NEEDED TABLET ORAL
Refills: 0 | Status: ACTIVE | COMMUNITY
Start: 2019-10-01

## 2023-03-10 RX ORDER — TRAMADOL HYDROCHLORIDE 50 MG/1
1 TABLET AS NEEDED TABLET, FILM COATED ORAL ONCE A DAY
Status: ACTIVE | COMMUNITY

## 2023-03-10 RX ORDER — BUPROPION HYDROCHLORIDE 300 MG/1
1 TABLET IN THE MORNING TABLET, EXTENDED RELEASE ORAL ONCE A DAY
Status: ACTIVE | COMMUNITY

## 2023-03-10 RX ORDER — LISINOPRIL AND HYDROCHLOROTHIAZIDE TABLETS 20; 12.5 MG/1; MG/1
TAKE 1 TABLET DAILY TABLET ORAL
Refills: 0 | Status: ACTIVE | COMMUNITY

## 2023-03-10 RX ORDER — DICYCLOMINE HYDROCHLORIDE 10 MG/1
TAKE 1 CAPSULE BY MOUTH EVERY 6 HOURS FOR 15 DAYS CAPSULE ORAL
Qty: 60 CAPSULE | Refills: 12 | Status: ACTIVE | COMMUNITY

## 2023-03-10 RX ORDER — ICOSAPENT ETHYL 1000 MG/1
2 CAPSULES WITH MEALS CAPSULE ORAL TWICE A DAY
Status: ACTIVE | COMMUNITY

## 2023-03-10 NOTE — EXAM-PHYSICAL EXAM
She is alert and oriented to person place and situation no acute distress.  There is no scleral icterus.  She does have an occasional halting voice.

## 2023-03-10 NOTE — HPI-TODAY'S VISIT:
Ms. Yost is a 61-year-old woman followed by Dr. Sherman in the past for multiple issues including gastroparesis, peptic ulcer disease and screening colonoscopy. He has also followed her for abdominal pain and mild elevations in transaminases. Imaging studies showed dilated bile duct and pancreatic duct, but EUS and MRCP were negative. Her last colonoscopy was performed in December 2017. This was unremarkable. EUS in March 2021 was negative. She has an MRI which revealed a 0.5 mm IPMN.  She was seen on 04/02/2021 with complaints of postprandial abdominal pain. She had laboratory testing which revealed continued abnormal ALT at 34, but her AST returned to normal. She also reported continued complaints of nausea. Her symptoms were felt to be likely secondary to gastroparesis. She was to continue her regimen consisting of metoclopramide as needed along with Zofran and Phenergan.  She continues to experience generalized abdominal discomfort.  The pain will occasionally radiate to her left upper paraspinal area.  She reports significant amounts of nausea, episodes of diaphoresis and abdominal cramping.  Denies any known precipitating factors.  Denies vomiting, fevers or chills.  No unintentional weight loss.  She had an abdominal ultrasound performed on 9/24/2021 which revealed possible common bile duct filling defect versus artifact; the common bile duct is stable in caliber when compared to prior MRI gallbladder is surgically absent; MRCP could be beneficial if clinically indicated.  She also had labs performed on 9/20/2021 which revealed WBC 11.0, RBC 4.92, hemoglobin/hematocrit 14.7/44.7, MCV 91, platelets 271; hemoglobin A1c of 6.6.  CRP 42.  Since her last office that she has been on multiple antibiotics for dental surgery and UTI.  She denies any diarrhea these times.  In fact, she reports constipation in which she has to strain to have a bowel movement.  She uses MiraLAX & stool softeners to help improve her constipation.  No red blood per rectum or melena.  She was last seen 9/27/2021 for continued abdominal pain and nausea. Symptoms were suspected to be multifactorial from gastroparesis and poor glycemic control. An MRCP was planned due to questionable filling defect of the common bile duct seen on MRI 9/2020. MRCP 10/14/2021 revealed mild hepatic steatosis and no biliary obstruction, filling defect or stricture. Common bile duct was diameter of 1 cm which is likely normal s/p cholecystectomy. An ERCP was considered for future evaluation. BMP, hepatic function panel and lipase were also ordered to rule out hepatic or pancreatic etiologies. Labs 9/27/2021: Lipase WNL, glucose 111, creatinine 1.22, GFR 47, BUN/Creatinine ratio, Albumin 4.9, ALT 35. She was to continue using dicyclomine as needed for pain and continue Dexilant for acid reflux symptoms.   Today she still complains of epigastric pain that worsens after eating and radiates to her back. The pain is sharp and intermittent. She admits to wakes her from sleep at times. She states her dental infection has come back and she is currently on Augmentin. She had labs performed at with PCP on 9/22/2021: WBC 11.0, CRP 42. She was sent to a rheumatologist, Dr. Ortiz, due to elevated CRP and intermittent lower extremity. Polymyalgia rheumatica was not suspected at this time. She also admits to breakthrough acid reflux symptoms despite being on Dexilant once daily. She uses Gaviscon tablets for breakthrough symptoms. She has regular bowel movements  with occasional lower abdominal cramping for which she takes dicyclomine. She has not tried using dicyclomine for her epigastric pain. No bleeding per rectum or black tarry stools. However she does admit to greasy stools every now and then. No yellowing of eyes or skin. Interval history.  Laboratory testing performed in April of this year revealed a glucose of 136 BUN 15 creatinine 1.4.  LFTs were normal with an AST of 33 ALT of 24.  This was an improvement from the past.  Total bilirubin and alkaline phosphatase were both normal.  Hemoglobin A1c was down to 6.9.  C-reactive protein was 1.  Sed rate 13. The patient states over the last few weeks she has had recurrence of fairly significant nausea and epigastric pain but this is also coincided with a marked decline in her blood sugar control.  She states her blood sugars are yoyoing between 55 and 400.  She is having difficulty in adjusting her pump to take care of this.  She does take Dexilant.  She has constipation though intermixed occasionally with soft stools that are difficult to expel.  She is on MiraLAX but not on fiber.  She thinks that the blood sugar control has developed because of significant stress she is under and taking care of her father who is on hospice care at home. Interval history, 3/10/2023.  I reviewed referring records.  Laboratory testing February 2 of this year revealed an elevated creatinine at 1.1 otherwise normal CMP other than ALT of 36.  CBC was unremarkable.  Hemoglobin A1c was 6.7.  TSH was normal at 1.3. The patient states since I last saw her she has had problems with voice changes.  She has seen ENT and neurology.  She has had steroid injections.  She brought with her her ENT notes which suggest likely a multifactorial etiology to include acid reflux.  She remains on Dexilant.  She still has constipation.  She takes fiber and MiraLAX as needed.  We discussed taking these more routinely.

## 2023-03-29 ENCOUNTER — OFFICE VISIT (OUTPATIENT)
Dept: URBAN - METROPOLITAN AREA SURGERY CENTER 25 | Facility: SURGERY CENTER | Age: 63
End: 2023-03-29
Payer: COMMERCIAL

## 2023-03-29 DIAGNOSIS — K21.9 GASTROESOPHAGEAL REFLUX DISEASE: ICD-10-CM

## 2023-03-29 DIAGNOSIS — K31.7 GASTRIC POLYPS: ICD-10-CM

## 2023-03-29 DIAGNOSIS — K29.50 CHRONIC GASTRITIS: ICD-10-CM

## 2023-03-29 PROCEDURE — G8907 PT DOC NO EVENTS ON DISCHARG: HCPCS | Performed by: INTERNAL MEDICINE

## 2023-03-29 PROCEDURE — 43239 EGD BIOPSY SINGLE/MULTIPLE: CPT | Performed by: INTERNAL MEDICINE

## 2023-03-29 RX ORDER — ALPRAZOLAM 1 MG/1
TAKE 1 TABLET 3 TIMES DAILY AS NEEDED TABLET ORAL
Refills: 0 | Status: ACTIVE | COMMUNITY
Start: 2019-10-01

## 2023-03-29 RX ORDER — TRAMADOL HYDROCHLORIDE 50 MG/1
1 TABLET AS NEEDED TABLET, FILM COATED ORAL ONCE A DAY
Status: ACTIVE | COMMUNITY

## 2023-03-29 RX ORDER — EZETIMIBE 10 MG/1
1 TABLET TABLET ORAL ONCE A DAY
Status: ACTIVE | COMMUNITY

## 2023-03-29 RX ORDER — ASPIRIN 81 MG/1
1 TABLET TABLET ORAL ONCE A DAY
Status: ACTIVE | COMMUNITY

## 2023-03-29 RX ORDER — INSULIN ASPART 100 [IU]/ML
ADM UP TO 80 UNI VIA INSULIN PUMP D INJECTION, SOLUTION INTRAVENOUS; SUBCUTANEOUS
Qty: 20 | Refills: 0 | Status: ACTIVE | COMMUNITY
Start: 2020-02-20

## 2023-03-29 RX ORDER — DICYCLOMINE HYDROCHLORIDE 10 MG/1
TAKE 1 CAPSULE BY MOUTH EVERY 6 HOURS FOR 15 DAYS CAPSULE ORAL
Qty: 60 CAPSULE | Refills: 12 | Status: ACTIVE | COMMUNITY

## 2023-03-29 RX ORDER — POLYETHYLENE GLYCOL 3350 17 G/17G
AS DIRECTED POWDER, FOR SOLUTION ORAL
Status: ACTIVE | COMMUNITY

## 2023-03-29 RX ORDER — DULOXETINE 60 MG/1
1 CAPSULE CAPSULE, DELAYED RELEASE PELLETS ORAL ONCE A DAY
Status: ACTIVE | COMMUNITY

## 2023-03-29 RX ORDER — ROSUVASTATIN CALCIUM 40 MG/1
TAKE 1 TABLET DAILY TABLET, FILM COATED ORAL
Refills: 0 | Status: ACTIVE | COMMUNITY

## 2023-03-29 RX ORDER — DEXLANSOPRAZOLE 60 MG/1
TAKE 1 CAPSULE BY MOUTH EVERY DAY CAPSULE, DELAYED RELEASE ORAL
Status: ACTIVE | COMMUNITY

## 2023-03-29 RX ORDER — BUPROPION HYDROCHLORIDE 300 MG/1
1 TABLET IN THE MORNING TABLET, EXTENDED RELEASE ORAL ONCE A DAY
Status: ACTIVE | COMMUNITY

## 2023-03-29 RX ORDER — ICOSAPENT ETHYL 1000 MG/1
2 CAPSULES WITH MEALS CAPSULE ORAL TWICE A DAY
Status: ACTIVE | COMMUNITY

## 2023-03-29 RX ORDER — WHEAT DEXTRIN 3 G/3.5 G
AS DIRECTED POWDER (GRAM) ORAL
Status: ACTIVE | COMMUNITY

## 2023-03-29 RX ORDER — LISINOPRIL AND HYDROCHLOROTHIAZIDE TABLETS 20; 12.5 MG/1; MG/1
TAKE 1 TABLET DAILY TABLET ORAL
Refills: 0 | Status: ACTIVE | COMMUNITY

## 2023-03-29 RX ORDER — PREGABALIN 75 MG/1
1 CAPSULE CAPSULE ORAL ONCE A DAY
Status: ACTIVE | COMMUNITY

## 2023-03-29 RX ORDER — ONDANSETRON 4 MG/1
DISSOLVE 1 TABLET ON THE TONGUE EVERY 8 HOURS AS NEEDED FOR NAUSEA AND VOMITING TABLET, ORALLY DISINTEGRATING ORAL
Qty: 30 TABLET | Refills: 1 | Status: ACTIVE | COMMUNITY

## 2023-03-29 RX ORDER — LEVOTHYROXINE SODIUM 75 UG/1
1 TABLET IN THE MORNING ON AN EMPTY STOMACH TABLET ORAL ONCE A DAY
Refills: 0 | Status: ACTIVE | COMMUNITY

## 2023-03-29 RX ORDER — CANAGLIFLOZIN 100 MG/1
TAKE 1 TABLET BY MOUTH ONCE DAILY 30 MINS BEFORE BREAKFAST TABLET, FILM COATED ORAL
Refills: 0 | Status: ACTIVE | COMMUNITY
Start: 2020-01-13

## 2023-04-24 ENCOUNTER — OFFICE VISIT (OUTPATIENT)
Dept: URBAN - METROPOLITAN AREA SURGERY CENTER 25 | Facility: SURGERY CENTER | Age: 63
End: 2023-04-24

## 2023-04-28 ENCOUNTER — WEB ENCOUNTER (OUTPATIENT)
Dept: URBAN - METROPOLITAN AREA CLINIC 113 | Facility: CLINIC | Age: 63
End: 2023-04-28

## 2023-05-09 ENCOUNTER — WEB ENCOUNTER (OUTPATIENT)
Dept: URBAN - METROPOLITAN AREA CLINIC 113 | Facility: CLINIC | Age: 63
End: 2023-05-09

## 2023-05-10 ENCOUNTER — OFFICE VISIT (OUTPATIENT)
Dept: URBAN - METROPOLITAN AREA CLINIC 113 | Facility: CLINIC | Age: 63
End: 2023-05-10
Payer: COMMERCIAL

## 2023-05-10 VITALS
TEMPERATURE: 97.5 F | BODY MASS INDEX: 32.53 KG/M2 | WEIGHT: 183.6 LBS | DIASTOLIC BLOOD PRESSURE: 78 MMHG | HEART RATE: 92 BPM | RESPIRATION RATE: 18 BRPM | SYSTOLIC BLOOD PRESSURE: 113 MMHG | HEIGHT: 63 IN

## 2023-05-10 DIAGNOSIS — E11.43 TYPE 2 DIABETES MELLITUS WITH DIABETIC AUTONOMIC (POLY)NEUROPATHY: ICD-10-CM

## 2023-05-10 DIAGNOSIS — R07.0 THROAT PAIN: ICD-10-CM

## 2023-05-10 DIAGNOSIS — K83.4 SPHINCTER OF ODDI DYSFUNCTION: ICD-10-CM

## 2023-05-10 DIAGNOSIS — F80.81 STUTTERING: ICD-10-CM

## 2023-05-10 DIAGNOSIS — R68.2 DRY MOUTH: ICD-10-CM

## 2023-05-10 DIAGNOSIS — K83.8 DILATED BILE DUCT: ICD-10-CM

## 2023-05-10 DIAGNOSIS — D49.0 IPMN (INTRADUCTAL PAPILLARY MUCINOUS NEOPLASM): ICD-10-CM

## 2023-05-10 DIAGNOSIS — K21.9 GASTROESOPHAGEAL REFLUX DISEASE WITHOUT ESOPHAGITIS: ICD-10-CM

## 2023-05-10 DIAGNOSIS — R11.0 NAUSEA: ICD-10-CM

## 2023-05-10 DIAGNOSIS — R74.8 ABNORMAL LIVER ENZYMES: ICD-10-CM

## 2023-05-10 DIAGNOSIS — R10.13 EPIGASTRIC PAIN: ICD-10-CM

## 2023-05-10 PROBLEM — 39423001: Status: ACTIVE | Noted: 2023-05-10

## 2023-05-10 PROCEDURE — 99214 OFFICE O/P EST MOD 30 MIN: CPT

## 2023-05-10 RX ORDER — PREGABALIN 75 MG/1
1 CAPSULE CAPSULE ORAL ONCE A DAY
Status: ON HOLD | COMMUNITY

## 2023-05-10 RX ORDER — ICOSAPENT ETHYL 1000 MG/1
2 CAPSULES WITH MEALS CAPSULE ORAL TWICE A DAY
Status: ACTIVE | COMMUNITY

## 2023-05-10 RX ORDER — ALPRAZOLAM 1 MG/1
TAKE 1 TABLET 3 TIMES DAILY AS NEEDED TABLET ORAL
Refills: 0 | Status: ACTIVE | COMMUNITY
Start: 2019-10-01

## 2023-05-10 RX ORDER — CANAGLIFLOZIN 100 MG/1
TAKE 1 TABLET BY MOUTH ONCE DAILY 30 MINS BEFORE BREAKFAST TABLET, FILM COATED ORAL
Refills: 0 | Status: ACTIVE | COMMUNITY
Start: 2020-01-13

## 2023-05-10 RX ORDER — ONDANSETRON 4 MG/1
DISSOLVE 1 TABLET ON THE TONGUE EVERY 8 HOURS AS NEEDED FOR NAUSEA AND VOMITING TABLET, ORALLY DISINTEGRATING ORAL
Qty: 30 TABLET | Refills: 1 | Status: ACTIVE | COMMUNITY

## 2023-05-10 RX ORDER — DICYCLOMINE HYDROCHLORIDE 10 MG/1
TAKE 1 CAPSULE BY MOUTH EVERY 6 HOURS FOR 15 DAYS CAPSULE ORAL
Qty: 60 CAPSULE | Refills: 12 | Status: ACTIVE | COMMUNITY

## 2023-05-10 RX ORDER — ROSUVASTATIN CALCIUM 40 MG/1
TAKE 1 TABLET DAILY TABLET, FILM COATED ORAL
Refills: 0 | Status: ACTIVE | COMMUNITY

## 2023-05-10 RX ORDER — EZETIMIBE 10 MG/1
1 TABLET TABLET ORAL ONCE A DAY
Status: ACTIVE | COMMUNITY

## 2023-05-10 RX ORDER — PRIMIDONE 50 MG/1
1 TABLET TABLET ORAL ONCE A DAY
Status: ACTIVE | COMMUNITY

## 2023-05-10 RX ORDER — DEXLANSOPRAZOLE 60 MG/1
TAKE 1 CAPSULE BY MOUTH EVERY DAY CAPSULE, DELAYED RELEASE ORAL
Status: ACTIVE | COMMUNITY

## 2023-05-10 RX ORDER — LEVOTHYROXINE SODIUM 75 UG/1
1 TABLET IN THE MORNING ON AN EMPTY STOMACH TABLET ORAL ONCE A DAY
Refills: 0 | Status: ACTIVE | COMMUNITY

## 2023-05-10 RX ORDER — LISINOPRIL AND HYDROCHLOROTHIAZIDE TABLETS 20; 12.5 MG/1; MG/1
TAKE 1 TABLET DAILY TABLET ORAL
Refills: 0 | Status: ACTIVE | COMMUNITY

## 2023-05-10 RX ORDER — INSULIN ASPART 100 [IU]/ML
ADM UP TO 80 UNI VIA INSULIN PUMP D INJECTION, SOLUTION INTRAVENOUS; SUBCUTANEOUS
Qty: 20 | Refills: 0 | Status: ACTIVE | COMMUNITY
Start: 2020-02-20

## 2023-05-10 RX ORDER — BUPROPION HYDROCHLORIDE 300 MG/1
1 TABLET IN THE MORNING TABLET, EXTENDED RELEASE ORAL ONCE A DAY
Status: ACTIVE | COMMUNITY

## 2023-05-10 RX ORDER — DULOXETINE 60 MG/1
1 CAPSULE CAPSULE, DELAYED RELEASE PELLETS ORAL ONCE A DAY
Status: ACTIVE | COMMUNITY

## 2023-05-10 RX ORDER — TRAMADOL HYDROCHLORIDE 50 MG/1
1 TABLET AS NEEDED TABLET, FILM COATED ORAL ONCE A DAY
Status: ACTIVE | COMMUNITY

## 2023-05-10 RX ORDER — PILOCARPINE HYDROCHLORIDE 5 MG/1
1 TABLET TABLET, FILM COATED ORAL THREE TIMES A DAY
Status: ACTIVE | COMMUNITY

## 2023-05-10 RX ORDER — ASPIRIN 81 MG/1
1 TABLET TABLET ORAL ONCE A DAY
Status: ON HOLD | COMMUNITY

## 2023-05-10 RX ORDER — FAMOTIDINE 40 MG/1
1 TABLET AT BEDTIME TABLET, FILM COATED ORAL ONCE A DAY
Qty: 90 TABLET | Refills: 2 | OUTPATIENT
Start: 2023-05-10

## 2023-05-10 RX ORDER — WHEAT DEXTRIN 3 G/3.5 G
AS DIRECTED POWDER (GRAM) ORAL
Status: ACTIVE | COMMUNITY

## 2023-05-10 RX ORDER — POLYETHYLENE GLYCOL 3350 17 G/17G
AS DIRECTED POWDER, FOR SOLUTION ORAL
Status: ACTIVE | COMMUNITY

## 2023-05-10 NOTE — HPI-TODAY'S VISIT:
Ms. Yost is a 61-year-old woman followed by Dr. Sherman in the past for multiple issues including gastroparesis, peptic ulcer disease and screening colonoscopy. He has also followed her for abdominal pain and mild elevations in transaminases. Imaging studies showed dilated bile duct and pancreatic duct, but EUS and MRCP were negative. Her last colonoscopy was performed in December 2017. This was unremarkable. EUS in March 2021 was negative. She has an MRI which revealed a 0.5 mm IPMN.   She was seen on 04/02/2021 with complaints of postprandial abdominal pain. She had laboratory testing which revealed continued abnormal ALT at 34, but her AST returned to normal. She also reported continued complaints of nausea. Her symptoms were felt to be likely secondary to gastroparesis. She was to continue her regimen consisting of metoclopramide as needed along with Zofran and Phenergan.  She continues to experience generalized abdominal discomfort.  The pain will occasionally radiate to her left upper paraspinal area.  She reports significant amounts of nausea, episodes of diaphoresis and abdominal cramping.  Denies any known precipitating factors.  Denies vomiting, fevers or chills.  No unintentional weight loss.  She had an abdominal ultrasound performed on 9/24/2021 which revealed possible common bile duct filling defect versus artifact; the common bile duct is stable in caliber when compared to prior MRI gallbladder is surgically absent; MRCP could be beneficial if clinically indicated.  She also had labs performed on 9/20/2021 which revealed WBC 11.0, RBC 4.92, hemoglobin/hematocrit 14.7/44.7, MCV 91, platelets 271; hemoglobin A1c of 6.6.  CRP 42.  Since her last office that she has been on multiple antibiotics for dental surgery and UTI.  She denies any diarrhea these times.  In fact, she reports constipation in which she has to strain to have a bowel movement.  She uses MiraLAX & stool softeners to help improve her constipation.  No red blood per rectum or melena.  She was last seen 9/27/2021 for continued abdominal pain and nausea. Symptoms were suspected to be multifactorial from gastroparesis and poor glycemic control. An MRCP was planned due to questionable filling defect of the common bile duct seen on MRI 9/2020. MRCP 10/14/2021 revealed mild hepatic steatosis and no biliary obstruction, filling defect or stricture. Common bile duct was diameter of 1 cm which is likely normal s/p cholecystectomy. An ERCP was considered for future evaluation. BMP, hepatic function panel and lipase were also ordered to rule out hepatic or pancreatic etiologies.   Labs 9/27/2021: Lipase WNL, glucose 111, creatinine 1.22, GFR 47, BUN/Creatinine ratio, Albumin 4.9, ALT 35. She was to continue using dicyclomine as needed for pain and continue Dexilant for acid reflux symptoms.   Today she still complains of epigastric pain that worsens after eating and radiates to her back. The pain is sharp and intermittent. She admits to wakes her from sleep at times. She states her dental infection has come back and she is currently on Augmentin. She had labs performed at with PCP on 9/22/2021: WBC 11.0, CRP 42. She was sent to a rheumatologist, Dr. Ortiz, due to elevated CRP and intermittent lower extremity. Polymyalgia rheumatica was not suspected at this time. She also admits to breakthrough acid reflux symptoms despite being on Dexilant once daily. She uses Gaviscon tablets for breakthrough symptoms. She has regular bowel movements  with occasional lower abdominal cramping for which she takes dicyclomine. She has not tried using dicyclomine for her epigastric pain. No bleeding per rectum or black tarry stools. However she does admit to greasy stools every now and then. No yellowing of eyes or skin.  Interval history.  Laboratory testing performed in April of this year revealed a glucose of 136 BUN 15 creatinine 1.4.  LFTs were normal with an AST of 33 ALT of 24.  This was an improvement from the past.  Total bilirubin and alkaline phosphatase were both normal.  Hemoglobin A1c was down to 6.9.  C-reactive protein was 1.  Sed rate 13. The patient states over the last few weeks she has had recurrence of fairly significant nausea and epigastric pain but this is also coincided with a marked decline in her blood sugar control.  She states her blood sugars are yoyoing between 55 and 400.  She is having difficulty in adjusting her pump to take care of this.  She does take Dexilant.  She has constipation though intermixed occasionally with soft stools that are difficult to expel.  She is on MiraLAX but not on fiber.  She thinks that the blood sugar control has developed because of significant stress she is under and taking care of her father who is on hospice care at home.  Interval history, 3/10/2023.  I reviewed referring records.  Laboratory testing February 2 of this year revealed an elevated creatinine at 1.1 otherwise normal CMP other than ALT of 36.  CBC was unremarkable.  Hemoglobin A1c was 6.7.  TSH was normal at 1.3. The patient states since I last saw her she has had problems with voice changes.  She has seen ENT and neurology.  She has had steroid injections.  She brought with her her ENT notes which suggest likely a multifactorial etiology to include acid reflux.  She remains on Dexilant.  She still has constipation.  She takes fiber and MiraLAX as needed.  We discussed taking these more routinely.  Interval history, 5/9/2023 62-year-old female presents for short interval follow-up.  She was last seen on 3/10/2023.  She was planned for EGD to assess for any changes in reflux esophagitis and and lieu of her history of peptic ulcer disease.  Regarding her elevated liver enzymes, this is likely related to ELIZONDO though given her family history of COPD and her own autoimmune diseases she was planned for repeat ASMA and alpha-1 antitrypsin phenotype.  Regarding her dilated bile duct, this is likely from the prior cholecystectomy as has been stable for many years she is due for MRI in December of this year for surveillance.  ASMA and alpha 1 antitrypsin serum was normal.   EGD was scheduled for 5/16 and rescheduled to 3/29.  EGD 3/29/2023: Performed without difficulty.  Duodenum was normal.  Erythematous mucosa in the gastric body and antrum, biopsied.  Pathology revealed chronic gastritis, negative for H. pylori, intestinal metaplasia or dysplasia. Multiple gastric polyps consistent with PPI polyps.  Normal esophagus.  Labs 5/4/2023: Glucose 145, creatinine 1.26, GFR 48, normal alkaline phosphatase, AST 44, ALT 51, normal H/H, triglycerides 209 otherwise normal lipid panel.  Per recent progress note with Dr. Garg, she has had a developing history of voice hoarseness, stuttering voice, globus sensation, dry mouth, mouth pain when swallowing and worsening tremor.  She has been seen by neurology and ENT both of which have not been able to diagnose her symptoms.  Her endocrinologist is questioning a palsy.  She has requested a referral to [facility].  Patient contacted the office about the above symptoms on 4/28 asking if this was related to her esophagus.  She has been followed by Dr. Javier who has performed several steroid injections on her throat?  She was informed symptoms were not likely coming from her esophagus as EGD was normal.  She has yet to see [facility]. SHe continues to have voice hoarseness, voice stuttering, throat pain and dry mouth. She has intermittent epigastric pain. Dicycomine does not help this pain. She does have intermitten breakthough reflux despite Dexilant 60 mg once daily. She has gained ten pounds since her last visit. She is on two new medications prescribed by neurology.

## 2023-05-16 ENCOUNTER — OFFICE VISIT (OUTPATIENT)
Dept: URBAN - METROPOLITAN AREA SURGERY CENTER 25 | Facility: SURGERY CENTER | Age: 63
End: 2023-05-16

## 2023-06-04 ENCOUNTER — WEB ENCOUNTER (OUTPATIENT)
Dept: URBAN - METROPOLITAN AREA CLINIC 113 | Facility: CLINIC | Age: 63
End: 2023-06-04

## 2023-06-20 ENCOUNTER — OFFICE VISIT (OUTPATIENT)
Dept: URBAN - METROPOLITAN AREA CLINIC 107 | Facility: CLINIC | Age: 63
End: 2023-06-20

## 2023-06-22 ENCOUNTER — LAB OUTSIDE AN ENCOUNTER (OUTPATIENT)
Dept: URBAN - METROPOLITAN AREA CLINIC 113 | Facility: CLINIC | Age: 63
End: 2023-06-22

## 2023-07-03 LAB
ALPHA-1-ANTITRYPSIN, SERUM: 146
PHENOTYPE (PI): (no result)

## 2023-07-12 ENCOUNTER — TELEPHONE ENCOUNTER (OUTPATIENT)
Dept: URBAN - METROPOLITAN AREA CLINIC 113 | Facility: CLINIC | Age: 63
End: 2023-07-12

## 2023-07-12 ENCOUNTER — OFFICE VISIT (OUTPATIENT)
Dept: URBAN - METROPOLITAN AREA CLINIC 113 | Facility: CLINIC | Age: 63
End: 2023-07-12
Payer: COMMERCIAL

## 2023-07-12 ENCOUNTER — DASHBOARD ENCOUNTERS (OUTPATIENT)
Age: 63
End: 2023-07-12

## 2023-07-12 VITALS
RESPIRATION RATE: 14 BRPM | HEIGHT: 63 IN | SYSTOLIC BLOOD PRESSURE: 131 MMHG | DIASTOLIC BLOOD PRESSURE: 86 MMHG | BODY MASS INDEX: 31.18 KG/M2 | WEIGHT: 176 LBS | HEART RATE: 89 BPM | TEMPERATURE: 97.5 F

## 2023-07-12 DIAGNOSIS — R74.8 ABNORMAL LIVER ENZYMES: ICD-10-CM

## 2023-07-12 DIAGNOSIS — R11.0 NAUSEA: ICD-10-CM

## 2023-07-12 DIAGNOSIS — K21.9 GASTROESOPHAGEAL REFLUX DISEASE WITHOUT ESOPHAGITIS: ICD-10-CM

## 2023-07-12 DIAGNOSIS — R10.13 EPIGASTRIC PAIN: ICD-10-CM

## 2023-07-12 DIAGNOSIS — K83.8 DILATED BILE DUCT: ICD-10-CM

## 2023-07-12 DIAGNOSIS — K75.81 NASH (NONALCOHOLIC STEATOHEPATITIS): ICD-10-CM

## 2023-07-12 DIAGNOSIS — K83.4 SPHINCTER OF ODDI DYSFUNCTION: ICD-10-CM

## 2023-07-12 PROBLEM — 442685003: Status: ACTIVE | Noted: 2023-07-12

## 2023-07-12 PROCEDURE — 99214 OFFICE O/P EST MOD 30 MIN: CPT | Performed by: INTERNAL MEDICINE

## 2023-07-12 RX ORDER — DICYCLOMINE HYDROCHLORIDE 10 MG/1
TAKE 1 CAPSULE BY MOUTH EVERY 6 HOURS FOR 15 DAYS CAPSULE ORAL
Qty: 60 CAPSULE | Refills: 12

## 2023-07-12 RX ORDER — ROSUVASTATIN CALCIUM 40 MG/1
TAKE 1 TABLET DAILY TABLET, FILM COATED ORAL
Refills: 0 | Status: ACTIVE | COMMUNITY

## 2023-07-12 RX ORDER — LEVOTHYROXINE SODIUM 75 UG/1
1 TABLET IN THE MORNING ON AN EMPTY STOMACH TABLET ORAL ONCE A DAY
Refills: 0 | Status: ACTIVE | COMMUNITY

## 2023-07-12 RX ORDER — PRIMIDONE 50 MG/1
1 TABLET TABLET ORAL ONCE A DAY
Status: ACTIVE | COMMUNITY

## 2023-07-12 RX ORDER — TRAMADOL HYDROCHLORIDE 50 MG/1
1 TABLET AS NEEDED TABLET, FILM COATED ORAL ONCE A DAY
Status: ACTIVE | COMMUNITY

## 2023-07-12 RX ORDER — FAMOTIDINE 40 MG/1
1 TABLET AT BEDTIME TABLET, FILM COATED ORAL ONCE A DAY
Qty: 90 TABLET | Refills: 2 | Status: ON HOLD | COMMUNITY
Start: 2023-05-10

## 2023-07-12 RX ORDER — PILOCARPINE HYDROCHLORIDE 5 MG/1
1 TABLET TABLET, FILM COATED ORAL THREE TIMES A DAY
Status: ON HOLD | COMMUNITY

## 2023-07-12 RX ORDER — CANAGLIFLOZIN 100 MG/1
TAKE 1 TABLET BY MOUTH ONCE DAILY 30 MINS BEFORE BREAKFAST TABLET, FILM COATED ORAL
Refills: 0 | Status: ACTIVE | COMMUNITY
Start: 2020-01-13

## 2023-07-12 RX ORDER — LISINOPRIL AND HYDROCHLOROTHIAZIDE TABLETS 20; 12.5 MG/1; MG/1
TAKE 1 TABLET DAILY TABLET ORAL
Refills: 0 | Status: ACTIVE | COMMUNITY

## 2023-07-12 RX ORDER — DICYCLOMINE HYDROCHLORIDE 10 MG/1
TAKE 1 CAPSULE BY MOUTH EVERY 6 HOURS FOR 15 DAYS CAPSULE ORAL
Qty: 60 CAPSULE | Refills: 12 | Status: ACTIVE | COMMUNITY

## 2023-07-12 RX ORDER — DEXLANSOPRAZOLE 60 MG/1
TAKE 1 CAPSULE BY MOUTH EVERY DAY CAPSULE, DELAYED RELEASE ORAL
Status: ACTIVE | COMMUNITY

## 2023-07-12 RX ORDER — ALPRAZOLAM 1 MG/1
TAKE 1 TABLET 3 TIMES DAILY AS NEEDED TABLET ORAL
Refills: 0 | Status: ACTIVE | COMMUNITY
Start: 2019-10-01

## 2023-07-12 RX ORDER — BUPROPION HYDROCHLORIDE 300 MG/1
1 TABLET IN THE MORNING TABLET, EXTENDED RELEASE ORAL ONCE A DAY
Status: ACTIVE | COMMUNITY

## 2023-07-12 RX ORDER — PREGABALIN 75 MG/1
1 CAPSULE CAPSULE ORAL ONCE A DAY
Status: ON HOLD | COMMUNITY

## 2023-07-12 RX ORDER — WHEAT DEXTRIN 3 G/3.5 G
AS DIRECTED POWDER (GRAM) ORAL
Status: ACTIVE | COMMUNITY

## 2023-07-12 RX ORDER — POLYETHYLENE GLYCOL 3350 17 G/17G
AS DIRECTED POWDER, FOR SOLUTION ORAL
Status: ACTIVE | COMMUNITY

## 2023-07-12 RX ORDER — ONDANSETRON 4 MG/1
DISSOLVE 1 TABLET ON THE TONGUE EVERY 8 HOURS AS NEEDED FOR NAUSEA AND VOMITING TABLET, ORALLY DISINTEGRATING ORAL
Qty: 30 TABLET | Refills: 1 | Status: ACTIVE | COMMUNITY

## 2023-07-12 RX ORDER — DULOXETINE 60 MG/1
1 CAPSULE CAPSULE, DELAYED RELEASE PELLETS ORAL ONCE A DAY
Status: ACTIVE | COMMUNITY

## 2023-07-12 RX ORDER — INSULIN ASPART 100 [IU]/ML
ADM UP TO 80 UNI VIA INSULIN PUMP D INJECTION, SOLUTION INTRAVENOUS; SUBCUTANEOUS
Qty: 20 | Refills: 0 | Status: ACTIVE | COMMUNITY
Start: 2020-02-20

## 2023-07-12 RX ORDER — ASPIRIN 81 MG/1
1 TABLET TABLET ORAL ONCE A DAY
Status: ON HOLD | COMMUNITY

## 2023-07-12 RX ORDER — ICOSAPENT ETHYL 1000 MG/1
2 CAPSULES WITH MEALS CAPSULE ORAL TWICE A DAY
Status: ACTIVE | COMMUNITY

## 2023-07-12 RX ORDER — EZETIMIBE 10 MG/1
1 TABLET TABLET ORAL ONCE A DAY
Status: ACTIVE | COMMUNITY

## 2023-07-12 NOTE — HPI-TODAY'S VISIT:
Ms. Yost is a 61-year-old woman followed by Dr. Sherman in the past for multiple issues including gastroparesis, peptic ulcer disease and screening colonoscopy. He has also followed her for abdominal pain and mild elevations in transaminases. Imaging studies showed dilated bile duct and pancreatic duct, but EUS and MRCP were negative. Her last colonoscopy was performed in December 2017. This was unremarkable. EUS in March 2021 was negative. She has an MRI which revealed a 0.5 mm IPMN.   She was seen on 04/02/2021 with complaints of postprandial abdominal pain. She had laboratory testing which revealed continued abnormal ALT at 34, but her AST returned to normal. She also reported continued complaints of nausea. Her symptoms were felt to be likely secondary to gastroparesis. She was to continue her regimen consisting of metoclopramide as needed along with Zofran and Phenergan.  She continues to experience generalized abdominal discomfort.  The pain will occasionally radiate to her left upper paraspinal area.  She reports significant amounts of nausea, episodes of diaphoresis and abdominal cramping.  Denies any known precipitating factors.  Denies vomiting, fevers or chills.  No unintentional weight loss.  She had an abdominal ultrasound performed on 9/24/2021 which revealed possible common bile duct filling defect versus artifact; the common bile duct is stable in caliber when compared to prior MRI gallbladder is surgically absent; MRCP could be beneficial if clinically indicated.  She also had labs performed on 9/20/2021 which revealed WBC 11.0, RBC 4.92, hemoglobin/hematocrit 14.7/44.7, MCV 91, platelets 271; hemoglobin A1c of 6.6.  CRP 42.  Since her last office that she has been on multiple antibiotics for dental surgery and UTI.  She denies any diarrhea these times.  In fact, she reports constipation in which she has to strain to have a bowel movement.  She uses MiraLAX & stool softeners to help improve her constipation.  No red blood per rectum or melena.  She was last seen 9/27/2021 for continued abdominal pain and nausea. Symptoms were suspected to be multifactorial from gastroparesis and poor glycemic control. An MRCP was planned due to questionable filling defect of the common bile duct seen on MRI 9/2020. MRCP 10/14/2021 revealed mild hepatic steatosis and no biliary obstruction, filling defect or stricture. Common bile duct was diameter of 1 cm which is likely normal s/p cholecystectomy. An ERCP was considered for future evaluation. BMP, hepatic function panel and lipase were also ordered to rule out hepatic or pancreatic etiologies.   Labs 9/27/2021: Lipase WNL, glucose 111, creatinine 1.22, GFR 47, BUN/Creatinine ratio, Albumin 4.9, ALT 35. She was to continue using dicyclomine as needed for pain and continue Dexilant for acid reflux symptoms.   Today she still complains of epigastric pain that worsens after eating and radiates to her back. The pain is sharp and intermittent. She admits to wakes her from sleep at times. She states her dental infection has come back and she is currently on Augmentin. She had labs performed at with PCP on 9/22/2021: WBC 11.0, CRP 42. She was sent to a rheumatologist, Dr. Ortiz, due to elevated CRP and intermittent lower extremity. Polymyalgia rheumatica was not suspected at this time. She also admits to breakthrough acid reflux symptoms despite being on Dexilant once daily. She uses Gaviscon tablets for breakthrough symptoms. She has regular bowel movements  with occasional lower abdominal cramping for which she takes dicyclomine. She has not tried using dicyclomine for her epigastric pain. No bleeding per rectum or black tarry stools. However she does admit to greasy stools every now and then. No yellowing of eyes or skin.  Interval history.  Laboratory testing performed in April of this year revealed a glucose of 136 BUN 15 creatinine 1.4.  LFTs were normal with an AST of 33 ALT of 24.  This was an improvement from the past.  Total bilirubin and alkaline phosphatase were both normal.  Hemoglobin A1c was down to 6.9.  C-reactive protein was 1.  Sed rate 13. The patient states over the last few weeks she has had recurrence of fairly significant nausea and epigastric pain but this is also coincided with a marked decline in her blood sugar control.  She states her blood sugars are yoyoing between 55 and 400.  She is having difficulty in adjusting her pump to take care of this.  She does take Dexilant.  She has constipation though intermixed occasionally with soft stools that are difficult to expel.  She is on MiraLAX but not on fiber.  She thinks that the blood sugar control has developed because of significant stress she is under and taking care of her father who is on hospice care at home.  Interval history, 3/10/2023.  I reviewed referring records.  Laboratory testing February 2 of this year revealed an elevated creatinine at 1.1 otherwise normal CMP other than ALT of 36.  CBC was unremarkable.  Hemoglobin A1c was 6.7.  TSH was normal at 1.3. The patient states since I last saw her she has had problems with voice changes.  She has seen ENT and neurology.  She has had steroid injections.  She brought with her her ENT notes which suggest likely a multifactorial etiology to include acid reflux.  She remains on Dexilant.  She still has constipation.  She takes fiber and MiraLAX as needed.  We discussed taking these more routinely.  Interval history, 5/9/2023 62-year-old female presents for short interval follow-up.  She was last seen on 3/10/2023.  She was planned for EGD to assess for any changes in reflux esophagitis and and lieu of her history of peptic ulcer disease.  Regarding her elevated liver enzymes, this is likely related to ELIZONDO though given her family history of COPD and her own autoimmune diseases she was planned for repeat ASMA and alpha-1 antitrypsin phenotype.  Regarding her dilated bile duct, this is likely from the prior cholecystectomy as has been stable for many years she is due for MRI in December of this year for surveillance.  ASMA and alpha 1 antitrypsin serum was normal.   EGD was scheduled for 5/16 and rescheduled to 3/29.  EGD 3/29/2023: Performed without difficulty.  Duodenum was normal.  Erythematous mucosa in the gastric body and antrum, biopsied.  Pathology revealed chronic gastritis, negative for H. pylori, intestinal metaplasia or dysplasia. Multiple gastric polyps consistent with PPI polyps.  Normal esophagus.  Labs 5/4/2023: Glucose 145, creatinine 1.26, GFR 48, normal alkaline phosphatase, AST 44, ALT 51, normal H/H, triglycerides 209 otherwise normal lipid panel.  Per recent progress note with Dr. Garg, she has had a developing history of voice hoarseness, stuttering voice, globus sensation, dry mouth, mouth pain when swallowing and worsening tremor.  She has been seen by neurology and ENT both of which have not been able to diagnose her symptoms.  Her endocrinologist is questioning a palsy.  She has requested a referral to [facility].  Patient contacted the office about the above symptoms on 4/28 asking if this was related to her esophagus.  She has been followed by Dr. Javier who has performed several steroid injections on her throat?  She was informed symptoms were not likely coming from her esophagus as EGD was normal.  She has yet to see [facility]. SHe continues to have voice hoarseness, voice stuttering, throat pain and dry mouth. She has intermittent epigastric pain. Dicycomine does not help this pain. She does have intermitten breakthough reflux despite Dexilant 60 mg once daily. She has gained ten pounds since her last visit. She is on two new medications prescribed by neurology. Interval history, 7/12/2023.  MRI performed June 21 revealed steatosis and a minimally dilated bile duct.  Otherwise unremarkable exam.  Referring records were reviewed.  Renal ultrasound performed last month was unremarkable.  Laboratory testing from May 4 of this year revealed a glucose of 145 creatinine 1.3.  AST was elevated at 44 ALT 51.  CBC was unremarkable. The patient still has her throat clearing issues and voice changes.  She thinks that there is some issues with her CPAP machine that may be playing a role role.  She is also having constipation.  She has lower abdominal cramping with this.  She remains on Dexilant for her acid reflux she takes MiraLAX and fiber for her constipation.  She is due for colonoscopy in 2027.

## 2024-08-29 ENCOUNTER — OFFICE VISIT (OUTPATIENT)
Dept: URBAN - METROPOLITAN AREA CLINIC 113 | Facility: CLINIC | Age: 64
End: 2024-08-29
Payer: COMMERCIAL

## 2024-08-29 ENCOUNTER — LAB OUTSIDE AN ENCOUNTER (OUTPATIENT)
Dept: URBAN - METROPOLITAN AREA CLINIC 113 | Facility: CLINIC | Age: 64
End: 2024-08-29

## 2024-08-29 VITALS
SYSTOLIC BLOOD PRESSURE: 107 MMHG | RESPIRATION RATE: 18 BRPM | HEIGHT: 63 IN | TEMPERATURE: 98 F | HEART RATE: 83 BPM | BODY MASS INDEX: 29.95 KG/M2 | DIASTOLIC BLOOD PRESSURE: 71 MMHG | WEIGHT: 169 LBS

## 2024-08-29 DIAGNOSIS — K83.8 DILATED BILE DUCT: ICD-10-CM

## 2024-08-29 DIAGNOSIS — K21.9 GASTROESOPHAGEAL REFLUX DISEASE WITHOUT ESOPHAGITIS: ICD-10-CM

## 2024-08-29 DIAGNOSIS — R74.8 ABNORMAL LIVER ENZYMES: ICD-10-CM

## 2024-08-29 DIAGNOSIS — K75.81 NASH (NONALCOHOLIC STEATOHEPATITIS): ICD-10-CM

## 2024-08-29 PROCEDURE — 99214 OFFICE O/P EST MOD 30 MIN: CPT | Performed by: INTERNAL MEDICINE

## 2024-08-29 RX ORDER — ASPIRIN 81 MG/1
1 TABLET TABLET ORAL ONCE A DAY
Status: ON HOLD | COMMUNITY

## 2024-08-29 RX ORDER — PILOCARPINE HYDROCHLORIDE 5 MG/1
1 TABLET TABLET, FILM COATED ORAL THREE TIMES A DAY
Status: ON HOLD | COMMUNITY

## 2024-08-29 RX ORDER — INSULIN LISPRO 100 [IU]/ML
AS DIRECTED INJECTION, SOLUTION INTRAVENOUS; SUBCUTANEOUS
Status: ACTIVE | COMMUNITY

## 2024-08-29 RX ORDER — LEVOTHYROXINE SODIUM 75 UG/1
1 TABLET IN THE MORNING ON AN EMPTY STOMACH TABLET ORAL ONCE A DAY
Refills: 0 | Status: ON HOLD | COMMUNITY

## 2024-08-29 RX ORDER — EZETIMIBE 10 MG/1
1 TABLET TABLET ORAL ONCE A DAY
Status: ACTIVE | COMMUNITY

## 2024-08-29 RX ORDER — LISINOPRIL AND HYDROCHLOROTHIAZIDE TABLETS 20; 12.5 MG/1; MG/1
TAKE 1 TABLET DAILY TABLET ORAL
Refills: 0 | Status: ACTIVE | COMMUNITY

## 2024-08-29 RX ORDER — POLYETHYLENE GLYCOL 3350 17 G/17G
AS DIRECTED POWDER, FOR SOLUTION ORAL
Status: ON HOLD | COMMUNITY

## 2024-08-29 RX ORDER — DULOXETINE 60 MG/1
1 CAPSULE CAPSULE, DELAYED RELEASE PELLETS ORAL ONCE A DAY
Status: ON HOLD | COMMUNITY

## 2024-08-29 RX ORDER — DEXLANSOPRAZOLE 60 MG/1
TAKE 1 CAPSULE BY MOUTH EVERY DAY CAPSULE, DELAYED RELEASE ORAL
Status: ON HOLD | COMMUNITY

## 2024-08-29 RX ORDER — ALPRAZOLAM 1 MG/1
TAKE 1 TABLET 3 TIMES DAILY AS NEEDED TABLET ORAL
Refills: 0 | Status: ON HOLD | COMMUNITY
Start: 2019-10-01

## 2024-08-29 RX ORDER — INSULIN ASPART 100 [IU]/ML
ADM UP TO 80 UNI VIA INSULIN PUMP D INJECTION, SOLUTION INTRAVENOUS; SUBCUTANEOUS
Qty: 20 | Refills: 0 | Status: ACTIVE | COMMUNITY
Start: 2020-02-20

## 2024-08-29 RX ORDER — BUPROPION HYDROCHLORIDE 300 MG/1
1 TABLET IN THE MORNING TABLET, EXTENDED RELEASE ORAL ONCE A DAY
Status: ON HOLD | COMMUNITY

## 2024-08-29 RX ORDER — BUPROPION HYDROCHLORIDE 150 MG/1
1 TABLET IN THE MORNING TABLET, FILM COATED, EXTENDED RELEASE ORAL ONCE A DAY
Status: ACTIVE | COMMUNITY

## 2024-08-29 RX ORDER — DICYCLOMINE HYDROCHLORIDE 10 MG/1
TAKE 1 CAPSULE BY MOUTH EVERY 6 HOURS FOR 15 DAYS CAPSULE ORAL
Qty: 60 CAPSULE | Refills: 12 | Status: ON HOLD | COMMUNITY

## 2024-08-29 RX ORDER — ROSUVASTATIN CALCIUM 40 MG/1
TAKE 1 TABLET DAILY TABLET, FILM COATED ORAL
Refills: 0 | Status: ON HOLD | COMMUNITY

## 2024-08-29 RX ORDER — FAMOTIDINE 40 MG/1
1 TABLET AT BEDTIME TABLET, FILM COATED ORAL ONCE A DAY
Qty: 90 TABLET | Refills: 2 | Status: ON HOLD | COMMUNITY
Start: 2023-05-10

## 2024-08-29 RX ORDER — CANAGLIFLOZIN 100 MG/1
TAKE 1 TABLET BY MOUTH ONCE DAILY 30 MINS BEFORE BREAKFAST TABLET, FILM COATED ORAL
Refills: 0 | Status: ON HOLD | COMMUNITY
Start: 2020-01-13

## 2024-08-29 RX ORDER — TRAMADOL HYDROCHLORIDE 50 MG/1
1 TABLET AS NEEDED TABLET, FILM COATED ORAL ONCE A DAY
Status: ACTIVE | COMMUNITY

## 2024-08-29 RX ORDER — ICOSAPENT ETHYL 1000 MG/1
2 CAPSULES WITH MEALS CAPSULE ORAL TWICE A DAY
Status: ACTIVE | COMMUNITY

## 2024-08-29 RX ORDER — PRIMIDONE 50 MG/1
1 TABLET TABLET ORAL ONCE A DAY
Status: ACTIVE | COMMUNITY

## 2024-08-29 RX ORDER — ONDANSETRON 4 MG/1
DISSOLVE 1 TABLET ON THE TONGUE EVERY 8 HOURS AS NEEDED FOR NAUSEA AND VOMITING TABLET, ORALLY DISINTEGRATING ORAL
Qty: 30 TABLET | Refills: 1 | Status: ON HOLD | COMMUNITY

## 2024-08-29 RX ORDER — WHEAT DEXTRIN 3 G/3.5 G
AS DIRECTED POWDER (GRAM) ORAL
Status: ON HOLD | COMMUNITY

## 2024-08-29 RX ORDER — ROSUVASTATIN CALCIUM 20 MG/1
1 TABLET TABLET, COATED ORAL ONCE A DAY
Status: ACTIVE | COMMUNITY

## 2024-08-29 RX ORDER — PROPRANOLOL HYDROCHLORIDE 20 MG/1
1 TABLET TABLET ORAL TWICE A DAY
Status: ACTIVE | COMMUNITY

## 2024-08-29 RX ORDER — PREGABALIN 75 MG/1
1 CAPSULE CAPSULE ORAL ONCE A DAY
Status: ON HOLD | COMMUNITY

## 2024-08-29 NOTE — HPI-TODAY'S VISIT:
Ms. Yost is a 61-year-old woman followed by Dr. Sherman in the past for multiple issues including gastroparesis, peptic ulcer disease and screening colonoscopy. He has also followed her for abdominal pain and mild elevations in transaminases. Imaging studies showed dilated bile duct and pancreatic duct, but EUS and MRCP were negative. Her last colonoscopy was performed in December 2017. This was unremarkable. EUS in March 2021 was negative. She has an MRI which revealed a 0.5 mm IPMN.   She was seen on 04/02/2021 with complaints of postprandial abdominal pain. She had laboratory testing which revealed continued abnormal ALT at 34, but her AST returned to normal. She also reported continued complaints of nausea. Her symptoms were felt to be likely secondary to gastroparesis. She was to continue her regimen consisting of metoclopramide as needed along with Zofran and Phenergan.  She continues to experience generalized abdominal discomfort.  The pain will occasionally radiate to her left upper paraspinal area.  She reports significant amounts of nausea, episodes of diaphoresis and abdominal cramping.  Denies any known precipitating factors.  Denies vomiting, fevers or chills.  No unintentional weight loss.  She had an abdominal ultrasound performed on 9/24/2021 which revealed possible common bile duct filling defect versus artifact; the common bile duct is stable in caliber when compared to prior MRI gallbladder is surgically absent; MRCP could be beneficial if clinically indicated.  She also had labs performed on 9/20/2021 which revealed WBC 11.0, RBC 4.92, hemoglobin/hematocrit 14.7/44.7, MCV 91, platelets 271; hemoglobin A1c of 6.6.  CRP 42.  Since her last office that she has been on multiple antibiotics for dental surgery and UTI.  She denies any diarrhea these times.  In fact, she reports constipation in which she has to strain to have a bowel movement.  She uses MiraLAX & stool softeners to help improve her constipation.  No red blood per rectum or melena.  She was last seen 9/27/2021 for continued abdominal pain and nausea. Symptoms were suspected to be multifactorial from gastroparesis and poor glycemic control. An MRCP was planned due to questionable filling defect of the common bile duct seen on MRI 9/2020. MRCP 10/14/2021 revealed mild hepatic steatosis and no biliary obstruction, filling defect or stricture. Common bile duct was diameter of 1 cm which is likely normal s/p cholecystectomy. An ERCP was considered for future evaluation. BMP, hepatic function panel and lipase were also ordered to rule out hepatic or pancreatic etiologies.   Labs 9/27/2021: Lipase WNL, glucose 111, creatinine 1.22, GFR 47, BUN/Creatinine ratio, Albumin 4.9, ALT 35. She was to continue using dicyclomine as needed for pain and continue Dexilant for acid reflux symptoms.   Today she still complains of epigastric pain that worsens after eating and radiates to her back. The pain is sharp and intermittent. She admits to wakes her from sleep at times. She states her dental infection has come back and she is currently on Augmentin. She had labs performed at with PCP on 9/22/2021: WBC 11.0, CRP 42. She was sent to a rheumatologist, Dr. Ortiz, due to elevated CRP and intermittent lower extremity. Polymyalgia rheumatica was not suspected at this time. She also admits to breakthrough acid reflux symptoms despite being on Dexilant once daily. She uses Gaviscon tablets for breakthrough symptoms. She has regular bowel movements  with occasional lower abdominal cramping for which she takes dicyclomine. She has not tried using dicyclomine for her epigastric pain. No bleeding per rectum or black tarry stools. However she does admit to greasy stools every now and then. No yellowing of eyes or skin.  Interval history.  Laboratory testing performed in April of this year revealed a glucose of 136 BUN 15 creatinine 1.4.  LFTs were normal with an AST of 33 ALT of 24.  This was an improvement from the past.  Total bilirubin and alkaline phosphatase were both normal.  Hemoglobin A1c was down to 6.9.  C-reactive protein was 1.  Sed rate 13. The patient states over the last few weeks she has had recurrence of fairly significant nausea and epigastric pain but this is also coincided with a marked decline in her blood sugar control.  She states her blood sugars are yoyoing between 55 and 400.  She is having difficulty in adjusting her pump to take care of this.  She does take Dexilant.  She has constipation though intermixed occasionally with soft stools that are difficult to expel.  She is on MiraLAX but not on fiber.  She thinks that the blood sugar control has developed because of significant stress she is under and taking care of her father who is on hospice care at home.  Interval history, 3/10/2023.  I reviewed referring records.  Laboratory testing February 2 of this year revealed an elevated creatinine at 1.1 otherwise normal CMP other than ALT of 36.  CBC was unremarkable.  Hemoglobin A1c was 6.7.  TSH was normal at 1.3. The patient states since I last saw her she has had problems with voice changes.  She has seen ENT and neurology.  She has had steroid injections.  She brought with her her ENT notes which suggest likely a multifactorial etiology to include acid reflux.  She remains on Dexilant.  She still has constipation.  She takes fiber and MiraLAX as needed.  We discussed taking these more routinely.  Interval history, 5/9/2023 62-year-old female presents for short interval follow-up.  She was last seen on 3/10/2023.  She was planned for EGD to assess for any changes in reflux esophagitis and and lieu of her history of peptic ulcer disease.  Regarding her elevated liver enzymes, this is likely related to ELIZONDO though given her family history of COPD and her own autoimmune diseases she was planned for repeat ASMA and alpha-1 antitrypsin phenotype.  Regarding her dilated bile duct, this is likely from the prior cholecystectomy as has been stable for many years she is due for MRI in December of this year for surveillance.  ASMA and alpha 1 antitrypsin serum was normal.   EGD was scheduled for 5/16 and rescheduled to 3/29.  EGD 3/29/2023: Performed without difficulty.  Duodenum was normal.  Erythematous mucosa in the gastric body and antrum, biopsied.  Pathology revealed chronic gastritis, negative for H. pylori, intestinal metaplasia or dysplasia. Multiple gastric polyps consistent with PPI polyps.  Normal esophagus.  Labs 5/4/2023: Glucose 145, creatinine 1.26, GFR 48, normal alkaline phosphatase, AST 44, ALT 51, normal H/H, triglycerides 209 otherwise normal lipid panel.  Per recent progress note with Dr. Garg, she has had a developing history of voice hoarseness, stuttering voice, globus sensation, dry mouth, mouth pain when swallowing and worsening tremor.  She has been seen by neurology and ENT both of which have not been able to diagnose her symptoms.  Her endocrinologist is questioning a palsy.  She has requested a referral to [facility].  Patient contacted the office about the above symptoms on 4/28 asking if this was related to her esophagus.  She has been followed by Dr. Javier who has performed several steroid injections on her throat?  She was informed symptoms were not likely coming from her esophagus as EGD was normal.  She has yet to see [facility]. SHe continues to have voice hoarseness, voice stuttering, throat pain and dry mouth. She has intermittent epigastric pain. Dicycomine does not help this pain. She does have intermitten breakthough reflux despite Dexilant 60 mg once daily. She has gained ten pounds since her last visit. She is on two new medications prescribed by neurology. Interval history, 7/12/2023.  MRI performed June 21 revealed steatosis and a minimally dilated bile duct.  Otherwise unremarkable exam.  Referring records were reviewed.  Renal ultrasound performed last month was unremarkable.  Laboratory testing from May 4 of this year revealed a glucose of 145 creatinine 1.3.  AST was elevated at 44 ALT 51.  CBC was unremarkable. The patient still has her throat clearing issues and voice changes.  She thinks that there is some issues with her CPAP machine that may be playing a role role.  She is also having constipation.  She has lower abdominal cramping with this.  She remains on Dexilant for her acid reflux she takes MiraLAX and fiber for her constipation.  She is due for colonoscopy in 2027. Interval history, 8/29/2024.  Referring records were reviewed. The patient states over the last year she has had no new past medical or past surgical history.  She does states she continues have worsening problems with her voice.  She continues to follow-up with the ENT and neurology.  She states that ENT continues to believe that this is an acid reflux issue.  She remains on Dexilant.  Her only other GI complaint is alternating problems of constipation and fecal incontinence.  She is not currently on a fiber supplement.  She does have some constipation.

## 2024-08-30 LAB
A/G RATIO: 1.6
ABSOLUTE BASOPHILS: 43
ABSOLUTE EOSINOPHILS: 128
ABSOLUTE LYMPHOCYTES: 1505
ABSOLUTE MONOCYTES: 391
ABSOLUTE NEUTROPHILS: 5034
ALBUMIN: 4.2
ALKALINE PHOSPHATASE: 59
ALT (SGPT): 21
AST (SGOT): 26
BASOPHILS: 0.6
BILIRUBIN, TOTAL: 0.5
BUN/CREATININE RATIO: 11
BUN: 12
CALCIUM: 9
CARBON DIOXIDE, TOTAL: 29
CHLORIDE: 96
CREATININE: 1.14
EGFR: 53
EOSINOPHILS: 1.8
GLOBULIN, TOTAL: 2.6
GLUCOSE: 240
HEMATOCRIT: 40.3
HEMOGLOBIN: 13.1
LYMPHOCYTES: 21.2
MCH: 30.9
MCHC: 32.5
MCV: 95
MONOCYTES: 5.5
MPV: 9.4
NEUTROPHILS: 70.9
PLATELET COUNT: 228
POTASSIUM: 3.5
PROTEIN, TOTAL: 6.8
RDW: 12.8
RED BLOOD CELL COUNT: 4.24
SODIUM: 137
WHITE BLOOD CELL COUNT: 7.1

## 2024-11-06 ENCOUNTER — OFFICE VISIT (OUTPATIENT)
Dept: URBAN - METROPOLITAN AREA CLINIC 113 | Facility: CLINIC | Age: 64
End: 2024-11-06

## 2024-12-09 ENCOUNTER — TELEPHONE ENCOUNTER (OUTPATIENT)
Dept: URBAN - METROPOLITAN AREA CLINIC 113 | Facility: CLINIC | Age: 64
End: 2024-12-09

## 2024-12-09 ENCOUNTER — OFFICE VISIT (OUTPATIENT)
Dept: URBAN - METROPOLITAN AREA CLINIC 113 | Facility: CLINIC | Age: 64
End: 2024-12-09
Payer: COMMERCIAL

## 2024-12-09 VITALS
SYSTOLIC BLOOD PRESSURE: 154 MMHG | WEIGHT: 168.2 LBS | HEIGHT: 63 IN | TEMPERATURE: 97.7 F | RESPIRATION RATE: 18 BRPM | BODY MASS INDEX: 29.8 KG/M2 | DIASTOLIC BLOOD PRESSURE: 97 MMHG | HEART RATE: 81 BPM

## 2024-12-09 DIAGNOSIS — K21.9 GASTROESOPHAGEAL REFLUX DISEASE WITHOUT ESOPHAGITIS: ICD-10-CM

## 2024-12-09 DIAGNOSIS — K83.4 SPHINCTER OF ODDI DYSFUNCTION: ICD-10-CM

## 2024-12-09 DIAGNOSIS — R74.8 ABNORMAL LIVER ENZYMES: ICD-10-CM

## 2024-12-09 DIAGNOSIS — K75.81 NASH (NONALCOHOLIC STEATOHEPATITIS): ICD-10-CM

## 2024-12-09 DIAGNOSIS — K83.8 DILATED BILE DUCT: ICD-10-CM

## 2024-12-09 DIAGNOSIS — K59.09 CHRONIC CONSTIPATION: ICD-10-CM

## 2024-12-09 DIAGNOSIS — E11.43 TYPE 2 DIABETES MELLITUS WITH DIABETIC AUTONOMIC (POLY)NEUROPATHY: ICD-10-CM

## 2024-12-09 DIAGNOSIS — D49.0 IPMN (INTRADUCTAL PAPILLARY MUCINOUS NEOPLASM): ICD-10-CM

## 2024-12-09 DIAGNOSIS — R11.0 NAUSEA: ICD-10-CM

## 2024-12-09 PROCEDURE — 99214 OFFICE O/P EST MOD 30 MIN: CPT | Performed by: INTERNAL MEDICINE

## 2024-12-09 RX ORDER — EZETIMIBE 10 MG/1
1 TABLET TABLET ORAL ONCE A DAY
Status: ACTIVE | COMMUNITY

## 2024-12-09 RX ORDER — TRAMADOL HYDROCHLORIDE 50 MG/1
1 TABLET AS NEEDED TABLET, FILM COATED ORAL ONCE A DAY
Status: ACTIVE | COMMUNITY

## 2024-12-09 RX ORDER — CANAGLIFLOZIN 100 MG/1
TAKE 1 TABLET BY MOUTH ONCE DAILY 30 MINS BEFORE BREAKFAST TABLET, FILM COATED ORAL
Refills: 0 | Status: ON HOLD | COMMUNITY
Start: 2020-01-13

## 2024-12-09 RX ORDER — PREGABALIN 75 MG/1
1 CAPSULE CAPSULE ORAL ONCE A DAY
Status: ON HOLD | COMMUNITY

## 2024-12-09 RX ORDER — INSULIN LISPRO 100 [IU]/ML
AS DIRECTED INJECTION, SOLUTION INTRAVENOUS; SUBCUTANEOUS
Status: ACTIVE | COMMUNITY

## 2024-12-09 RX ORDER — PLECANATIDE 3 MG/1
1 TABLET TABLET ORAL ONCE A DAY
Qty: 90 TABLET | Refills: 3 | OUTPATIENT
Start: 2024-12-09 | End: 2025-12-04

## 2024-12-09 RX ORDER — PILOCARPINE HYDROCHLORIDE 5 MG/1
1 TABLET TABLET, FILM COATED ORAL THREE TIMES A DAY
Status: ON HOLD | COMMUNITY

## 2024-12-09 RX ORDER — BUPROPION HYDROCHLORIDE 300 MG/1
1 TABLET IN THE MORNING TABLET, EXTENDED RELEASE ORAL ONCE A DAY
Status: ON HOLD | COMMUNITY

## 2024-12-09 RX ORDER — ROSUVASTATIN CALCIUM 40 MG/1
TAKE 1 TABLET DAILY TABLET, FILM COATED ORAL
Refills: 0 | Status: ON HOLD | COMMUNITY

## 2024-12-09 RX ORDER — LUBIPROSTONE 24 UG/1
1 CAPSULE CAPSULE, GELATIN COATED ORAL TWICE A DAY
Qty: 60 CAPSULE | Refills: 2 | OUTPATIENT
Start: 2024-12-11 | End: 2025-03-11

## 2024-12-09 RX ORDER — ROSUVASTATIN CALCIUM 20 MG/1
1 TABLET TABLET, COATED ORAL ONCE A DAY
Status: ACTIVE | COMMUNITY

## 2024-12-09 RX ORDER — DICYCLOMINE HYDROCHLORIDE 10 MG/1
TAKE 1 CAPSULE BY MOUTH EVERY 6 HOURS FOR 15 DAYS CAPSULE ORAL
Qty: 60 CAPSULE | Refills: 12 | Status: ON HOLD | COMMUNITY

## 2024-12-09 RX ORDER — POLYETHYLENE GLYCOL 3350 17 G/DOSE
AS DIRECTED POWDER (GRAM) ORAL
Status: ON HOLD | COMMUNITY

## 2024-12-09 RX ORDER — WHEAT DEXTRIN 3 G/3.5 G
AS DIRECTED POWDER (GRAM) ORAL
Status: ON HOLD | COMMUNITY

## 2024-12-09 RX ORDER — DEXLANSOPRAZOLE 60 MG/1
1 CAPSULE CAPSULE, DELAYED RELEASE ORAL ONCE A DAY
Qty: 90 CAPSULE | Refills: 3 | OUTPATIENT
Start: 2024-12-09

## 2024-12-09 RX ORDER — ICOSAPENT ETHYL 1000 MG/1
2 CAPSULES WITH MEALS CAPSULE ORAL TWICE A DAY
Status: ACTIVE | COMMUNITY

## 2024-12-09 RX ORDER — ASPIRIN 81 MG/1
1 TABLET TABLET ORAL ONCE A DAY
Status: ON HOLD | COMMUNITY

## 2024-12-09 RX ORDER — DEXLANSOPRAZOLE 60 MG/1
TAKE 1 CAPSULE BY MOUTH EVERY DAY CAPSULE, DELAYED RELEASE ORAL
Status: ON HOLD | COMMUNITY

## 2024-12-09 RX ORDER — ALPRAZOLAM 1 MG/1
TAKE 1 TABLET 3 TIMES DAILY AS NEEDED TABLET ORAL
Refills: 0 | Status: ON HOLD | COMMUNITY
Start: 2019-10-01

## 2024-12-09 RX ORDER — FAMOTIDINE 40 MG/1
1 TABLET AT BEDTIME TABLET, FILM COATED ORAL ONCE A DAY
Qty: 90 TABLET | Refills: 2 | Status: ON HOLD | COMMUNITY
Start: 2023-05-10

## 2024-12-09 RX ORDER — PROPRANOLOL HYDROCHLORIDE 20 MG/1
1 TABLET TABLET ORAL TWICE A DAY
Status: ACTIVE | COMMUNITY

## 2024-12-09 RX ORDER — LISINOPRIL AND HYDROCHLOROTHIAZIDE TABLETS 20; 12.5 MG/1; MG/1
TAKE 1 TABLET DAILY TABLET ORAL
Refills: 0 | Status: ACTIVE | COMMUNITY

## 2024-12-09 RX ORDER — LEVOTHYROXINE SODIUM 75 UG/1
1 TABLET IN THE MORNING ON AN EMPTY STOMACH TABLET ORAL ONCE A DAY
Refills: 0 | Status: ON HOLD | COMMUNITY

## 2024-12-09 RX ORDER — DULOXETINE 60 MG/1
1 CAPSULE CAPSULE, DELAYED RELEASE PELLETS ORAL ONCE A DAY
Status: ON HOLD | COMMUNITY

## 2024-12-09 RX ORDER — INSULIN ASPART 100 [IU]/ML
ADM UP TO 80 UNI VIA INSULIN PUMP D INJECTION, SOLUTION INTRAVENOUS; SUBCUTANEOUS
Qty: 20 | Refills: 0 | Status: ACTIVE | COMMUNITY
Start: 2020-02-20

## 2024-12-09 RX ORDER — PRIMIDONE 50 MG/1
1 TABLET TABLET ORAL ONCE A DAY
Status: ACTIVE | COMMUNITY

## 2024-12-09 RX ORDER — BUPROPION HYDROCHLORIDE 150 MG/1
1 TABLET IN THE MORNING TABLET, FILM COATED, EXTENDED RELEASE ORAL ONCE A DAY
Status: ACTIVE | COMMUNITY

## 2024-12-09 RX ORDER — ONDANSETRON 4 MG/1
DISSOLVE 1 TABLET ON THE TONGUE EVERY 8 HOURS AS NEEDED FOR NAUSEA AND VOMITING TABLET, ORALLY DISINTEGRATING ORAL
Qty: 30 TABLET | Refills: 1 | Status: ON HOLD | COMMUNITY

## 2024-12-09 NOTE — HPI-TODAY'S VISIT:
Ms. Yost is a 61-year-old woman followed by Dr. Sherman in the past for multiple issues including gastroparesis, peptic ulcer disease and screening colonoscopy. He has also followed her for abdominal pain and mild elevations in transaminases. Imaging studies showed dilated bile duct and pancreatic duct, but EUS and MRCP were negative. Her last colonoscopy was performed in December 2017. This was unremarkable. EUS in March 2021 was negative. She has an MRI which revealed a 0.5 mm IPMN.   She was seen on 04/02/2021 with complaints of postprandial abdominal pain. She had laboratory testing which revealed continued abnormal ALT at 34, but her AST returned to normal. She also reported continued complaints of nausea. Her symptoms were felt to be likely secondary to gastroparesis. She was to continue her regimen consisting of metoclopramide as needed along with Zofran and Phenergan.  She continues to experience generalized abdominal discomfort.  The pain will occasionally radiate to her left upper paraspinal area.  She reports significant amounts of nausea, episodes of diaphoresis and abdominal cramping.  Denies any known precipitating factors.  Denies vomiting, fevers or chills.  No unintentional weight loss.  She had an abdominal ultrasound performed on 9/24/2021 which revealed possible common bile duct filling defect versus artifact; the common bile duct is stable in caliber when compared to prior MRI gallbladder is surgically absent; MRCP could be beneficial if clinically indicated.  She also had labs performed on 9/20/2021 which revealed WBC 11.0, RBC 4.92, hemoglobin/hematocrit 14.7/44.7, MCV 91, platelets 271; hemoglobin A1c of 6.6.  CRP 42.  Since her last office that she has been on multiple antibiotics for dental surgery and UTI.  She denies any diarrhea these times.  In fact, she reports constipation in which she has to strain to have a bowel movement.  She uses MiraLAX & stool softeners to help improve her constipation.  No red blood per rectum or melena.  She was last seen 9/27/2021 for continued abdominal pain and nausea. Symptoms were suspected to be multifactorial from gastroparesis and poor glycemic control. An MRCP was planned due to questionable filling defect of the common bile duct seen on MRI 9/2020. MRCP 10/14/2021 revealed mild hepatic steatosis and no biliary obstruction, filling defect or stricture. Common bile duct was diameter of 1 cm which is likely normal s/p cholecystectomy. An ERCP was considered for future evaluation. BMP, hepatic function panel and lipase were also ordered to rule out hepatic or pancreatic etiologies.   Labs 9/27/2021: Lipase WNL, glucose 111, creatinine 1.22, GFR 47, BUN/Creatinine ratio, Albumin 4.9, ALT 35. She was to continue using dicyclomine as needed for pain and continue Dexilant for acid reflux symptoms.   Today she still complains of epigastric pain that worsens after eating and radiates to her back. The pain is sharp and intermittent. She admits to wakes her from sleep at times. She states her dental infection has come back and she is currently on Augmentin. She had labs performed at with PCP on 9/22/2021: WBC 11.0, CRP 42. She was sent to a rheumatologist, Dr. Ortiz, due to elevated CRP and intermittent lower extremity. Polymyalgia rheumatica was not suspected at this time. She also admits to breakthrough acid reflux symptoms despite being on Dexilant once daily. She uses Gaviscon tablets for breakthrough symptoms. She has regular bowel movements  with occasional lower abdominal cramping for which she takes dicyclomine. She has not tried using dicyclomine for her epigastric pain. No bleeding per rectum or black tarry stools. However she does admit to greasy stools every now and then. No yellowing of eyes or skin.  Interval history.  Laboratory testing performed in April of this year revealed a glucose of 136 BUN 15 creatinine 1.4.  LFTs were normal with an AST of 33 ALT of 24.  This was an improvement from the past.  Total bilirubin and alkaline phosphatase were both normal.  Hemoglobin A1c was down to 6.9.  C-reactive protein was 1.  Sed rate 13. The patient states over the last few weeks she has had recurrence of fairly significant nausea and epigastric pain but this is also coincided with a marked decline in her blood sugar control.  She states her blood sugars are yoyoing between 55 and 400.  She is having difficulty in adjusting her pump to take care of this.  She does take Dexilant.  She has constipation though intermixed occasionally with soft stools that are difficult to expel.  She is on MiraLAX but not on fiber.  She thinks that the blood sugar control has developed because of significant stress she is under and taking care of her father who is on hospice care at home.  Interval history, 3/10/2023.  I reviewed referring records.  Laboratory testing February 2 of this year revealed an elevated creatinine at 1.1 otherwise normal CMP other than ALT of 36.  CBC was unremarkable.  Hemoglobin A1c was 6.7.  TSH was normal at 1.3. The patient states since I last saw her she has had problems with voice changes.  She has seen ENT and neurology.  She has had steroid injections.  She brought with her her ENT notes which suggest likely a multifactorial etiology to include acid reflux.  She remains on Dexilant.  She still has constipation.  She takes fiber and MiraLAX as needed.  We discussed taking these more routinely.  Interval history, 5/9/2023 62-year-old female presents for short interval follow-up.  She was last seen on 3/10/2023.  She was planned for EGD to assess for any changes in reflux esophagitis and and lieu of her history of peptic ulcer disease.  Regarding her elevated liver enzymes, this is likely related to ELIZONDO though given her family history of COPD and her own autoimmune diseases she was planned for repeat ASMA and alpha-1 antitrypsin phenotype.  Regarding her dilated bile duct, this is likely from the prior cholecystectomy as has been stable for many years she is due for MRI in December of this year for surveillance.  ASMA and alpha 1 antitrypsin serum was normal.   EGD was scheduled for 5/16 and rescheduled to 3/29.  EGD 3/29/2023: Performed without difficulty.  Duodenum was normal.  Erythematous mucosa in the gastric body and antrum, biopsied.  Pathology revealed chronic gastritis, negative for H. pylori, intestinal metaplasia or dysplasia. Multiple gastric polyps consistent with PPI polyps.  Normal esophagus.  Labs 5/4/2023: Glucose 145, creatinine 1.26, GFR 48, normal alkaline phosphatase, AST 44, ALT 51, normal H/H, triglycerides 209 otherwise normal lipid panel.  Per recent progress note with Dr. Garg, she has had a developing history of voice hoarseness, stuttering voice, globus sensation, dry mouth, mouth pain when swallowing and worsening tremor.  She has been seen by neurology and ENT both of which have not been able to diagnose her symptoms.  Her endocrinologist is questioning a palsy.  She has requested a referral to [facility].  Patient contacted the office about the above symptoms on 4/28 asking if this was related to her esophagus.  She has been followed by Dr. Javier who has performed several steroid injections on her throat?  She was informed symptoms were not likely coming from her esophagus as EGD was normal.  She has yet to see [facility]. SHe continues to have voice hoarseness, voice stuttering, throat pain and dry mouth. She has intermittent epigastric pain. Dicycomine does not help this pain. She does have intermitten breakthough reflux despite Dexilant 60 mg once daily. She has gained ten pounds since her last visit. She is on two new medications prescribed by neurology. Interval history, 7/12/2023.  MRI performed June 21 revealed steatosis and a minimally dilated bile duct.  Otherwise unremarkable exam.  Referring records were reviewed.  Renal ultrasound performed last month was unremarkable.  Laboratory testing from May 4 of this year revealed a glucose of 145 creatinine 1.3.  AST was elevated at 44 ALT 51.  CBC was unremarkable. The patient still has her throat clearing issues and voice changes.  She thinks that there is some issues with her CPAP machine that may be playing a role role.  She is also having constipation.  She has lower abdominal cramping with this.  She remains on Dexilant for her acid reflux she takes MiraLAX and fiber for her constipation.  She is due for colonoscopy in 2027. Interval history, 8/29/2024.  Referring records were reviewed. The patient states over the last year she has had no new past medical or past surgical history.  She does states she continues have worsening problems with her voice.  She continues to follow-up with the ENT and neurology.  She states that ENT continues to believe that this is an acid reflux issue.  She remains on Dexilant.  Her only other GI complaint is alternating problems of constipation and fecal incontinence.  She is not currently on a fiber supplement.  She does have some constipation. Interval history, 12/9/2024. Upper GI series performed November 11 of last month revealed some mild tertiary contractions of the esophagus otherwise unremarkable.  Right upper quadrant ultrasound performed November 8 of last month was unremarkable except the patient did have a shear wave velocity of 11.6 which was in the intermediate zone for fibrosis. The patient states she still having some constipation despite MiraLAX and fiber.  She does have occasional crampy abdominal discomfort before a bowel movement and just after for which she is taking dicyclomine.  She states she is very concerned because her insurance company is refusing to refill her Dexilant's operas all which is what she requires because of her known gastroparesis.  This has been dealt with in the past unfortunately it is coming back up again.  I reviewed her ultrasound results.  Patient states that her current diabetes physician is going to start her on Ozempic shortly.  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

## 2024-12-11 ENCOUNTER — ERX REFILL RESPONSE (OUTPATIENT)
Dept: URBAN - METROPOLITAN AREA CLINIC 113 | Facility: CLINIC | Age: 64
End: 2024-12-11

## 2024-12-11 RX ORDER — LUBIPROSTONE 24 UG/1
TAKE 1 CAPSULE BY MOUTH TWICE DAILY CAPSULE, GELATIN COATED ORAL
Qty: 180 CAPSULE | Refills: 0 | OUTPATIENT

## 2024-12-11 RX ORDER — LUBIPROSTONE 24 UG/1
1 CAPSULE CAPSULE, GELATIN COATED ORAL TWICE A DAY
Qty: 60 CAPSULE | Refills: 2 | OUTPATIENT

## 2025-02-07 ENCOUNTER — TELEPHONE ENCOUNTER (OUTPATIENT)
Dept: URBAN - METROPOLITAN AREA CLINIC 113 | Facility: CLINIC | Age: 65
End: 2025-02-07

## 2025-02-07 RX ORDER — DEXLANSOPRAZOLE 60 MG/1
1 CAPSULE CAPSULE, DELAYED RELEASE ORAL ONCE A DAY
Qty: 90 CAPSULE | Refills: 3
Start: 2024-12-09

## 2025-02-24 ENCOUNTER — OFFICE VISIT (OUTPATIENT)
Dept: URBAN - METROPOLITAN AREA CLINIC 113 | Facility: CLINIC | Age: 65
End: 2025-02-24

## 2025-04-17 ENCOUNTER — OFFICE VISIT (OUTPATIENT)
Dept: URBAN - METROPOLITAN AREA CLINIC 113 | Facility: CLINIC | Age: 65
End: 2025-04-17
Payer: COMMERCIAL

## 2025-04-17 DIAGNOSIS — K83.4 SPHINCTER OF ODDI DYSFUNCTION: ICD-10-CM

## 2025-04-17 DIAGNOSIS — K75.81 NASH (NONALCOHOLIC STEATOHEPATITIS): ICD-10-CM

## 2025-04-17 DIAGNOSIS — K59.09 CHRONIC CONSTIPATION: ICD-10-CM

## 2025-04-17 DIAGNOSIS — K21.9 GASTROESOPHAGEAL REFLUX DISEASE WITHOUT ESOPHAGITIS: ICD-10-CM

## 2025-04-17 DIAGNOSIS — D49.0 IPMN (INTRADUCTAL PAPILLARY MUCINOUS NEOPLASM): ICD-10-CM

## 2025-04-17 DIAGNOSIS — K83.8 DILATED BILE DUCT: ICD-10-CM

## 2025-04-17 DIAGNOSIS — R11.0 NAUSEA: ICD-10-CM

## 2025-04-17 PROCEDURE — 99214 OFFICE O/P EST MOD 30 MIN: CPT | Performed by: INTERNAL MEDICINE

## 2025-04-17 RX ORDER — ONDANSETRON 4 MG/1
1 TABLET ON THE TONGUE AND ALLOW TO DISSOLVE TABLET, ORALLY DISINTEGRATING ORAL
Qty: 30 TABLET | Refills: 1

## 2025-04-17 RX ORDER — TRAMADOL HYDROCHLORIDE 50 MG/1
1 TABLET AS NEEDED TABLET, FILM COATED ORAL ONCE A DAY
Status: ACTIVE | COMMUNITY

## 2025-04-17 RX ORDER — ONDANSETRON 4 MG/1
DISSOLVE 1 TABLET ON THE TONGUE EVERY 8 HOURS AS NEEDED FOR NAUSEA AND VOMITING TABLET, ORALLY DISINTEGRATING ORAL
Qty: 30 TABLET | Refills: 1 | Status: ACTIVE | COMMUNITY

## 2025-04-17 RX ORDER — DICYCLOMINE HYDROCHLORIDE 10 MG/1
TAKE 1 CAPSULE BY MOUTH EVERY 6 HOURS FOR 15 DAYS CAPSULE ORAL
Qty: 60 CAPSULE | Refills: 12 | Status: ACTIVE | COMMUNITY

## 2025-04-17 RX ORDER — INSULIN LISPRO 100 [IU]/ML
AS DIRECTED INJECTION, SOLUTION INTRAVENOUS; SUBCUTANEOUS
Status: ACTIVE | COMMUNITY

## 2025-04-17 RX ORDER — LEVOTHYROXINE SODIUM 75 UG/1
1 TABLET IN THE MORNING ON AN EMPTY STOMACH TABLET ORAL ONCE A DAY
Refills: 0 | Status: ACTIVE | COMMUNITY

## 2025-04-17 RX ORDER — DULOXETINE 60 MG/1
1 CAPSULE CAPSULE, DELAYED RELEASE PELLETS ORAL ONCE A DAY
Status: ACTIVE | COMMUNITY

## 2025-04-17 RX ORDER — PLECANATIDE 3 MG/1
1 TABLET TABLET ORAL ONCE A DAY
Qty: 90 TABLET | Refills: 3 | Status: ON HOLD | COMMUNITY
Start: 2024-12-09 | End: 2025-12-04

## 2025-04-17 RX ORDER — ALPRAZOLAM 1 MG/1
TAKE 1 TABLET 3 TIMES DAILY AS NEEDED TABLET ORAL
Refills: 0 | Status: ON HOLD | COMMUNITY
Start: 2019-10-01

## 2025-04-17 RX ORDER — ASPIRIN 81 MG/1
1 TABLET TABLET ORAL ONCE A DAY
Status: ON HOLD | COMMUNITY

## 2025-04-17 RX ORDER — SEMAGLUTIDE 0.68 MG/ML
AS DIRECTED INJECTION, SOLUTION SUBCUTANEOUS
Status: ACTIVE | COMMUNITY

## 2025-04-17 RX ORDER — WHEAT DEXTRIN 3 G/3.5 G
AS DIRECTED POWDER (GRAM) ORAL
Status: ON HOLD | COMMUNITY

## 2025-04-17 RX ORDER — PREGABALIN 75 MG/1
1 CAPSULE CAPSULE ORAL ONCE A DAY
Status: ON HOLD | COMMUNITY

## 2025-04-17 RX ORDER — ROSUVASTATIN CALCIUM 20 MG/1
1 TABLET TABLET, COATED ORAL ONCE A DAY
Status: ACTIVE | COMMUNITY

## 2025-04-17 RX ORDER — PROPRANOLOL HYDROCHLORIDE 20 MG/1
1 TABLET TABLET ORAL TWICE A DAY
Status: ACTIVE | COMMUNITY

## 2025-04-17 RX ORDER — FAMOTIDINE 40 MG/1
1 TABLET AT BEDTIME TABLET, FILM COATED ORAL ONCE A DAY
Qty: 90 TABLET | Refills: 2 | Status: ON HOLD | COMMUNITY
Start: 2023-05-10

## 2025-04-17 RX ORDER — BUPROPION HYDROCHLORIDE 300 MG/1
1 TABLET IN THE MORNING TABLET, EXTENDED RELEASE ORAL ONCE A DAY
Status: ON HOLD | COMMUNITY

## 2025-04-17 RX ORDER — EZETIMIBE 10 MG/1
1 TABLET TABLET ORAL ONCE A DAY
Status: ACTIVE | COMMUNITY

## 2025-04-17 RX ORDER — ICOSAPENT ETHYL 1000 MG/1
2 CAPSULES WITH MEALS CAPSULE ORAL TWICE A DAY
Status: ACTIVE | COMMUNITY

## 2025-04-17 RX ORDER — DEXLANSOPRAZOLE 60 MG/1
1 CAPSULE CAPSULE, DELAYED RELEASE ORAL ONCE A DAY
Qty: 90 CAPSULE | Refills: 3 | Status: ON HOLD | COMMUNITY
Start: 2024-12-09

## 2025-04-17 RX ORDER — CANAGLIFLOZIN 100 MG/1
TAKE 1 TABLET BY MOUTH ONCE DAILY 30 MINS BEFORE BREAKFAST TABLET, FILM COATED ORAL
Refills: 0 | Status: ON HOLD | COMMUNITY
Start: 2020-01-13

## 2025-04-17 RX ORDER — PRIMIDONE 50 MG/1
1 TABLET TABLET ORAL ONCE A DAY
Status: ACTIVE | COMMUNITY

## 2025-04-17 RX ORDER — ROSUVASTATIN CALCIUM 40 MG/1
TAKE 1 TABLET DAILY TABLET, FILM COATED ORAL
Refills: 0 | Status: ON HOLD | COMMUNITY

## 2025-04-17 RX ORDER — RIZATRIPTAN BENZOATE 10 MG/1
1 TABLET TABLET ORAL ONCE A DAY
Status: ACTIVE | COMMUNITY

## 2025-04-17 RX ORDER — DICYCLOMINE HYDROCHLORIDE 10 MG/1
1 TABLET CAPSULE ORAL
Qty: 40 | Refills: 3

## 2025-04-17 RX ORDER — DEXLANSOPRAZOLE 60 MG/1
TAKE 1 CAPSULE BY MOUTH EVERY DAY CAPSULE, DELAYED RELEASE ORAL
Status: ON HOLD | COMMUNITY

## 2025-04-17 RX ORDER — BUPROPION HYDROCHLORIDE 150 MG/1
1 TABLET IN THE MORNING TABLET, FILM COATED, EXTENDED RELEASE ORAL ONCE A DAY
Status: ACTIVE | COMMUNITY

## 2025-04-17 RX ORDER — PILOCARPINE HYDROCHLORIDE 5 MG/1
1 TABLET TABLET, FILM COATED ORAL THREE TIMES A DAY
Status: ON HOLD | COMMUNITY

## 2025-04-17 RX ORDER — POLYETHYLENE GLYCOL 3350 17 G/DOSE
AS DIRECTED POWDER (GRAM) ORAL
Status: ACTIVE | COMMUNITY

## 2025-04-17 RX ORDER — LISINOPRIL AND HYDROCHLOROTHIAZIDE TABLETS 20; 12.5 MG/1; MG/1
TAKE 1 TABLET DAILY TABLET ORAL
Refills: 0 | Status: ACTIVE | COMMUNITY

## 2025-04-17 RX ORDER — INSULIN ASPART 100 [IU]/ML
ADM UP TO 80 UNI VIA INSULIN PUMP D INJECTION, SOLUTION INTRAVENOUS; SUBCUTANEOUS
Qty: 20 | Refills: 0 | Status: ON HOLD | COMMUNITY
Start: 2020-02-20

## 2025-04-17 RX ORDER — LUBIPROSTONE 24 UG/1
TAKE 1 CAPSULE BY MOUTH TWICE DAILY CAPSULE, GELATIN COATED ORAL
Qty: 180 CAPSULE | Refills: 0 | Status: ACTIVE | COMMUNITY

## 2025-04-17 NOTE — HPI-TODAY'S VISIT:
Ms. Yost is a 61-year-old woman followed by Dr. Sherman in the past for multiple issues including gastroparesis, peptic ulcer disease and screening colonoscopy. He has also followed her for abdominal pain and mild elevations in transaminases. Imaging studies showed dilated bile duct and pancreatic duct, but EUS and MRCP were negative. Her last colonoscopy was performed in December 2017. This was unremarkable. EUS in March 2021 was negative. She has an MRI which revealed a 0.5 mm IPMN.   She was seen on 04/02/2021 with complaints of postprandial abdominal pain. She had laboratory testing which revealed continued abnormal ALT at 34, but her AST returned to normal. She also reported continued complaints of nausea. Her symptoms were felt to be likely secondary to gastroparesis. She was to continue her regimen consisting of metoclopramide as needed along with Zofran and Phenergan.  She continues to experience generalized abdominal discomfort.  The pain will occasionally radiate to her left upper paraspinal area.  She reports significant amounts of nausea, episodes of diaphoresis and abdominal cramping.  Denies any known precipitating factors.  Denies vomiting, fevers or chills.  No unintentional weight loss.  She had an abdominal ultrasound performed on 9/24/2021 which revealed possible common bile duct filling defect versus artifact; the common bile duct is stable in caliber when compared to prior MRI gallbladder is surgically absent; MRCP could be beneficial if clinically indicated.  She also had labs performed on 9/20/2021 which revealed WBC 11.0, RBC 4.92, hemoglobin/hematocrit 14.7/44.7, MCV 91, platelets 271; hemoglobin A1c of 6.6.  CRP 42.  Since her last office that she has been on multiple antibiotics for dental surgery and UTI.  She denies any diarrhea these times.  In fact, she reports constipation in which she has to strain to have a bowel movement.  She uses MiraLAX & stool softeners to help improve her constipation.  No red blood per rectum or melena.  She was last seen 9/27/2021 for continued abdominal pain and nausea. Symptoms were suspected to be multifactorial from gastroparesis and poor glycemic control. An MRCP was planned due to questionable filling defect of the common bile duct seen on MRI 9/2020. MRCP 10/14/2021 revealed mild hepatic steatosis and no biliary obstruction, filling defect or stricture. Common bile duct was diameter of 1 cm which is likely normal s/p cholecystectomy. An ERCP was considered for future evaluation. BMP, hepatic function panel and lipase were also ordered to rule out hepatic or pancreatic etiologies.   Labs 9/27/2021: Lipase WNL, glucose 111, creatinine 1.22, GFR 47, BUN/Creatinine ratio, Albumin 4.9, ALT 35. She was to continue using dicyclomine as needed for pain and continue Dexilant for acid reflux symptoms.   Today she still complains of epigastric pain that worsens after eating and radiates to her back. The pain is sharp and intermittent. She admits to wakes her from sleep at times. She states her dental infection has come back and she is currently on Augmentin. She had labs performed at with PCP on 9/22/2021: WBC 11.0, CRP 42. She was sent to a rheumatologist, Dr. Ortiz, due to elevated CRP and intermittent lower extremity. Polymyalgia rheumatica was not suspected at this time. She also admits to breakthrough acid reflux symptoms despite being on Dexilant once daily. She uses Gaviscon tablets for breakthrough symptoms. She has regular bowel movements  with occasional lower abdominal cramping for which she takes dicyclomine. She has not tried using dicyclomine for her epigastric pain. No bleeding per rectum or black tarry stools. However she does admit to greasy stools every now and then. No yellowing of eyes or skin.  Interval history.  Laboratory testing performed in April of this year revealed a glucose of 136 BUN 15 creatinine 1.4.  LFTs were normal with an AST of 33 ALT of 24.  This was an improvement from the past.  Total bilirubin and alkaline phosphatase were both normal.  Hemoglobin A1c was down to 6.9.  C-reactive protein was 1.  Sed rate 13. The patient states over the last few weeks she has had recurrence of fairly significant nausea and epigastric pain but this is also coincided with a marked decline in her blood sugar control.  She states her blood sugars are yoyoing between 55 and 400.  She is having difficulty in adjusting her pump to take care of this.  She does take Dexilant.  She has constipation though intermixed occasionally with soft stools that are difficult to expel.  She is on MiraLAX but not on fiber.  She thinks that the blood sugar control has developed because of significant stress she is under and taking care of her father who is on hospice care at home.  Interval history, 3/10/2023.  I reviewed referring records.  Laboratory testing February 2 of this year revealed an elevated creatinine at 1.1 otherwise normal CMP other than ALT of 36.  CBC was unremarkable.  Hemoglobin A1c was 6.7.  TSH was normal at 1.3. The patient states since I last saw her she has had problems with voice changes.  She has seen ENT and neurology.  She has had steroid injections.  She brought with her her ENT notes which suggest likely a multifactorial etiology to include acid reflux.  She remains on Dexilant.  She still has constipation.  She takes fiber and MiraLAX as needed.  We discussed taking these more routinely.  Interval history, 5/9/2023 62-year-old female presents for short interval follow-up.  She was last seen on 3/10/2023.  She was planned for EGD to assess for any changes in reflux esophagitis and and lieu of her history of peptic ulcer disease.  Regarding her elevated liver enzymes, this is likely related to ELIZONDO though given her family history of COPD and her own autoimmune diseases she was planned for repeat ASMA and alpha-1 antitrypsin phenotype.  Regarding her dilated bile duct, this is likely from the prior cholecystectomy as has been stable for many years she is due for MRI in December of this year for surveillance.  ASMA and alpha 1 antitrypsin serum was normal.   EGD was scheduled for 5/16 and rescheduled to 3/29.  EGD 3/29/2023: Performed without difficulty.  Duodenum was normal.  Erythematous mucosa in the gastric body and antrum, biopsied.  Pathology revealed chronic gastritis, negative for H. pylori, intestinal metaplasia or dysplasia. Multiple gastric polyps consistent with PPI polyps.  Normal esophagus.  Labs 5/4/2023: Glucose 145, creatinine 1.26, GFR 48, normal alkaline phosphatase, AST 44, ALT 51, normal H/H, triglycerides 209 otherwise normal lipid panel.  Per recent progress note with Dr. Garg, she has had a developing history of voice hoarseness, stuttering voice, globus sensation, dry mouth, mouth pain when swallowing and worsening tremor.  She has been seen by neurology and ENT both of which have not been able to diagnose her symptoms.  Her endocrinologist is questioning a palsy.  She has requested a referral to [facility].  Patient contacted the office about the above symptoms on 4/28 asking if this was related to her esophagus.  She has been followed by Dr. Javier who has performed several steroid injections on her throat?  She was informed symptoms were not likely coming from her esophagus as EGD was normal.  She has yet to see [facility]. SHe continues to have voice hoarseness, voice stuttering, throat pain and dry mouth. She has intermittent epigastric pain. Dicycomine does not help this pain. She does have intermitten breakthough reflux despite Dexilant 60 mg once daily. She has gained ten pounds since her last visit. She is on two new medications prescribed by neurology. Interval history, 7/12/2023.  MRI performed June 21 revealed steatosis and a minimally dilated bile duct.  Otherwise unremarkable exam.  Referring records were reviewed.  Renal ultrasound performed last month was unremarkable.  Laboratory testing from May 4 of this year revealed a glucose of 145 creatinine 1.3.  AST was elevated at 44 ALT 51.  CBC was unremarkable. The patient still has her throat clearing issues and voice changes.  She thinks that there is some issues with her CPAP machine that may be playing a role role.  She is also having constipation.  She has lower abdominal cramping with this.  She remains on Dexilant for her acid reflux she takes MiraLAX and fiber for her constipation.  She is due for colonoscopy in 2027. Interval history, 8/29/2024.  Referring records were reviewed. The patient states over the last year she has had no new past medical or past surgical history.  She does states she continues have worsening problems with her voice.  She continues to follow-up with the ENT and neurology.  She states that ENT continues to believe that this is an acid reflux issue.  She remains on Dexilant.  Her only other GI complaint is alternating problems of constipation and fecal incontinence.  She is not currently on a fiber supplement.  She does have some constipation. Interval history, 12/9/2024. Upper GI series performed November 11 of last month revealed some mild tertiary contractions of the esophagus otherwise unremarkable.  Right upper quadrant ultrasound performed November 8 of last month was unremarkable except the patient did have a shear wave velocity of 11.6 which was in the intermediate zone for fibrosis. The patient states she still having some constipation despite MiraLAX and fiber.  She does have occasional crampy abdominal discomfort before a bowel movement and just after for which she is taking dicyclomine.  She states she is very concerned because her insurance company is refusing to refill her Dexilant's operas all which is what she requires because of her known gastroparesis.  This has been dealt with in the past unfortunately it is coming back up again.  I reviewed her ultrasound results.  Patient states that her current diabetes physician is going to start her on Ozempic shortly.  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 Interval history, 4/17/2025. Referring records were reviewed.  Laboratory testing dated February 12, 2025 showed a CBC with an elevated white cell count of 11.3 otherwise normal CBC, BMP with an elevated creatinine of 1.3 and elevated glucose of 116.  Normal INR. The patient states since I last saw her she had a fall and injured her back.  She had to have cement injections in her back.  She states that her insurance company would not provide her with Trulance.  We switched her to Amitiza which she says works okay.  She supplements it with MiraLAX.  She admits that she is fairly noncompliant with her fiber.  She does state that she has intermittent crampy abdominal pain.  She takes dicyclomine as needed for this.  She is currently on Ozempic.  She does have occasional nausea for which she takes Zofran.  She desires refills of that medication.  She is due for colonoscopy in 2027.

## 2025-04-18 LAB
A/G RATIO: 2
ALBUMIN: 4.4
ALKALINE PHOSPHATASE: 51
ALT (SGPT): 21
AST (SGOT): 27
BILIRUBIN, TOTAL: 0.6
BUN/CREATININE RATIO: 9
BUN: 10
CALCIUM: 9.6
CARBON DIOXIDE, TOTAL: 34
CHLORIDE: 96
CREATININE: 1.13
EGFR: 54
GLOBULIN, TOTAL: 2.2
GLUCOSE: 94
POTASSIUM: 4.1
PROTEIN, TOTAL: 6.6
SODIUM: 137

## 2025-05-01 ENCOUNTER — ERX REFILL RESPONSE (OUTPATIENT)
Dept: URBAN - METROPOLITAN AREA CLINIC 113 | Facility: CLINIC | Age: 65
End: 2025-05-01

## 2025-05-01 RX ORDER — LUBIPROSTONE 24 UG/1
TAKE 1 CAPSULE BY MOUTH TWICE DAILY CAPSULE, GELATIN COATED ORAL
Qty: 180 CAPSULE | Refills: 3